# Patient Record
Sex: MALE | Race: WHITE | NOT HISPANIC OR LATINO | Employment: OTHER | URBAN - METROPOLITAN AREA
[De-identification: names, ages, dates, MRNs, and addresses within clinical notes are randomized per-mention and may not be internally consistent; named-entity substitution may affect disease eponyms.]

---

## 2018-07-09 ENCOUNTER — OFFICE VISIT CONVERTED (OUTPATIENT)
Dept: SURGERY | Facility: CLINIC | Age: 78
End: 2018-07-09
Attending: UROLOGY

## 2018-08-22 ENCOUNTER — OFFICE VISIT CONVERTED (OUTPATIENT)
Dept: OTHER | Facility: HOSPITAL | Age: 78
End: 2018-08-22
Attending: NURSE PRACTITIONER

## 2018-11-21 ENCOUNTER — OFFICE VISIT CONVERTED (OUTPATIENT)
Dept: OTHER | Facility: HOSPITAL | Age: 78
End: 2018-11-21
Attending: NURSE PRACTITIONER

## 2018-11-21 ENCOUNTER — CONVERSION ENCOUNTER (OUTPATIENT)
Dept: OTHER | Facility: HOSPITAL | Age: 78
End: 2018-11-21

## 2019-02-21 ENCOUNTER — OFFICE VISIT CONVERTED (OUTPATIENT)
Dept: OTHER | Facility: HOSPITAL | Age: 79
End: 2019-02-21
Attending: NURSE PRACTITIONER

## 2019-05-23 ENCOUNTER — HOSPITAL ENCOUNTER (OUTPATIENT)
Dept: OTHER | Facility: HOSPITAL | Age: 79
Discharge: HOME | End: 2019-05-23

## 2019-05-23 ENCOUNTER — CONVERSION ENCOUNTER (OUTPATIENT)
Dept: OTHER | Facility: HOSPITAL | Age: 79
End: 2019-05-23

## 2019-05-23 ENCOUNTER — OFFICE VISIT CONVERTED (OUTPATIENT)
Dept: OTHER | Facility: HOSPITAL | Age: 79
End: 2019-05-23
Attending: NURSE PRACTITIONER

## 2019-05-23 LAB
ANION GAP SERPL CALC-SCNC: 18 MMOL/L (ref 8–19)
BASOPHILS # BLD AUTO: 0.08 10*3/UL (ref 0–0.2)
BASOPHILS NFR BLD AUTO: 1.3 % (ref 0–3)
BUN SERPL-MCNC: 23 MG/DL (ref 5–25)
BUN/CREAT SERPL: 16 {RATIO} (ref 6–20)
CALCIUM SERPL-MCNC: 9.5 MG/DL (ref 8.7–10.4)
CHLORIDE SERPL-SCNC: 107 MMOL/L (ref 99–111)
CONV ABS IMM GRAN: 0.03 10*3/UL (ref 0–0.2)
CONV CO2: 24 MMOL/L (ref 22–32)
CONV IMMATURE GRAN: 0.5 % (ref 0–1.8)
CREAT UR-MCNC: 1.41 MG/DL (ref 0.7–1.2)
DEPRECATED RDW RBC AUTO: 44.7 FL (ref 35.1–43.9)
EOSINOPHIL # BLD AUTO: 0.27 10*3/UL (ref 0–0.7)
EOSINOPHIL # BLD AUTO: 4.3 % (ref 0–7)
ERYTHROCYTE [DISTWIDTH] IN BLOOD BY AUTOMATED COUNT: 13.1 % (ref 11.6–14.4)
GFR SERPLBLD BASED ON 1.73 SQ M-ARVRAT: 48 ML/MIN/{1.73_M2}
GLUCOSE SERPL-MCNC: 126 MG/DL (ref 70–99)
HBA1C MFR BLD: 12.6 G/DL (ref 14–18)
HCT VFR BLD AUTO: 38.7 % (ref 42–52)
LYMPHOCYTES # BLD AUTO: 1.49 10*3/UL (ref 1–5)
MAGNESIUM SERPL-MCNC: 1.84 MG/DL (ref 1.6–2.3)
MCH RBC QN AUTO: 31 PG (ref 27–31)
MCHC RBC AUTO-ENTMCNC: 32.6 G/DL (ref 33–37)
MCV RBC AUTO: 95.1 FL (ref 80–96)
MONOCYTES # BLD AUTO: 0.46 10*3/UL (ref 0.2–1.2)
MONOCYTES NFR BLD AUTO: 7.3 % (ref 3–10)
NEUTROPHILS # BLD AUTO: 3.94 10*3/UL (ref 2–8)
NEUTROPHILS NFR BLD AUTO: 62.8 % (ref 30–85)
NRBC CBCN: 0 % (ref 0–0.7)
OSMOLALITY SERPL CALC.SUM OF ELEC: 303 MOSM/KG (ref 273–304)
PLATELET # BLD AUTO: 190 10*3/UL (ref 130–400)
PMV BLD AUTO: 12.1 FL (ref 9.4–12.4)
POTASSIUM SERPL-SCNC: 4.7 MMOL/L (ref 3.5–5.3)
RBC # BLD AUTO: 4.07 10*6/UL (ref 4.7–6.1)
SODIUM SERPL-SCNC: 144 MMOL/L (ref 135–147)
TSH SERPL-ACNC: 9.4 M[IU]/L (ref 0.27–4.2)
VARIANT LYMPHS NFR BLD MANUAL: 23.8 % (ref 20–45)
WBC # BLD AUTO: 6.27 10*3/UL (ref 4.8–10.8)

## 2019-05-24 LAB — CONV THYROXINE TOTAL: 6.8 UG/DL (ref 4.5–12)

## 2019-06-10 ENCOUNTER — OFFICE VISIT CONVERTED (OUTPATIENT)
Dept: SURGERY | Facility: CLINIC | Age: 79
End: 2019-06-10
Attending: UROLOGY

## 2019-06-12 ENCOUNTER — HOSPITAL ENCOUNTER (OUTPATIENT)
Dept: PERIOP | Facility: HOSPITAL | Age: 79
Setting detail: HOSPITAL OUTPATIENT SURGERY
Discharge: HOME | End: 2019-06-12

## 2019-06-12 LAB
GLUCOSE BLD-MCNC: 130 MG/DL (ref 70–99)
GLUCOSE BLD-MCNC: 155 MG/DL (ref 70–99)

## 2019-06-18 ENCOUNTER — OFFICE VISIT CONVERTED (OUTPATIENT)
Dept: SURGERY | Facility: CLINIC | Age: 79
End: 2019-06-18
Attending: PHYSICIAN ASSISTANT

## 2019-06-20 LAB
COLOR STONE: NORMAL
COMPN STONE: NORMAL
CONV CA OXALATE MONOHYDRATE: 97 %
CONV CALCIUM PHOSPHATE: 3 %
CONV CALCULI COMMENT: NORMAL
CONV CALCULI DISCLAIMER: NORMAL
CONV CALCULI NOTE: NORMAL
NIDUS STONE QL: NORMAL
SIZE STONE: NORMAL MM
WT STONE: 35.2 MG

## 2019-07-02 ENCOUNTER — OFFICE VISIT CONVERTED (OUTPATIENT)
Dept: SURGERY | Facility: CLINIC | Age: 79
End: 2019-07-02
Attending: UROLOGY

## 2019-08-22 ENCOUNTER — OFFICE VISIT CONVERTED (OUTPATIENT)
Dept: OTHER | Facility: HOSPITAL | Age: 79
End: 2019-08-22
Attending: NURSE PRACTITIONER

## 2019-08-22 ENCOUNTER — HOSPITAL ENCOUNTER (OUTPATIENT)
Dept: OTHER | Facility: HOSPITAL | Age: 79
Discharge: HOME | End: 2019-08-22

## 2019-08-22 ENCOUNTER — CONVERSION ENCOUNTER (OUTPATIENT)
Dept: OTHER | Facility: HOSPITAL | Age: 79
End: 2019-08-22

## 2019-08-22 LAB
ALBUMIN SERPL-MCNC: 3.8 G/DL (ref 3.5–5)
ALBUMIN/GLOB SERPL: 1.2 {RATIO} (ref 1.4–2.6)
ALP SERPL-CCNC: 88 U/L (ref 56–155)
ALT SERPL-CCNC: 29 U/L (ref 10–40)
ANION GAP SERPL CALC-SCNC: 23 MMOL/L (ref 8–19)
AST SERPL-CCNC: 31 U/L (ref 15–50)
BASOPHILS # BLD AUTO: 0.09 10*3/UL (ref 0–0.2)
BASOPHILS NFR BLD AUTO: 1.4 % (ref 0–3)
BILIRUB SERPL-MCNC: 0.37 MG/DL (ref 0.2–1.3)
BUN SERPL-MCNC: 16 MG/DL (ref 5–25)
BUN/CREAT SERPL: 13 {RATIO} (ref 6–20)
CALCIUM SERPL-MCNC: 9.7 MG/DL (ref 8.7–10.4)
CHLORIDE SERPL-SCNC: 104 MMOL/L (ref 99–111)
CHOLEST SERPL-MCNC: 142 MG/DL (ref 107–200)
CHOLEST/HDLC SERPL: 5.1 {RATIO} (ref 3–6)
CONV ABS IMM GRAN: 0.03 10*3/UL (ref 0–0.2)
CONV CO2: 18 MMOL/L (ref 22–32)
CONV CREATININE URINE, RANDOM: 71 MG/DL (ref 10–300)
CONV IMMATURE GRAN: 0.5 % (ref 0–1.8)
CONV MICROALBUM.,U,RANDOM: 1853.3 MG/L (ref 0–20)
CONV TOTAL PROTEIN: 6.9 G/DL (ref 6.3–8.2)
CREAT UR-MCNC: 1.28 MG/DL (ref 0.7–1.2)
DEPRECATED RDW RBC AUTO: 46.4 FL (ref 35.1–43.9)
DIGOXIN SERPL-MCNC: 0.4 NG/ML (ref 0.5–2)
EOSINOPHIL # BLD AUTO: 0.34 10*3/UL (ref 0–0.7)
EOSINOPHIL # BLD AUTO: 5.2 % (ref 0–7)
ERYTHROCYTE [DISTWIDTH] IN BLOOD BY AUTOMATED COUNT: 13.3 % (ref 11.6–14.4)
EST. AVERAGE GLUCOSE BLD GHB EST-MCNC: 148 MG/DL
GFR SERPLBLD BASED ON 1.73 SQ M-ARVRAT: 53 ML/MIN/{1.73_M2}
GLOBULIN UR ELPH-MCNC: 3.1 G/DL (ref 2–3.5)
GLUCOSE SERPL-MCNC: 65 MG/DL (ref 70–99)
HBA1C MFR BLD: 6.8 % (ref 3.5–5.7)
HCT VFR BLD AUTO: 34.5 % (ref 42–52)
HDLC SERPL-MCNC: 28 MG/DL (ref 40–60)
HGB BLD-MCNC: 11 G/DL (ref 14–18)
LDLC SERPL CALC-MCNC: 71 MG/DL (ref 70–100)
LYMPHOCYTES # BLD AUTO: 1.33 10*3/UL (ref 1–5)
LYMPHOCYTES NFR BLD AUTO: 20.3 % (ref 20–45)
MCH RBC QN AUTO: 30.2 PG (ref 27–31)
MCHC RBC AUTO-ENTMCNC: 31.9 G/DL (ref 33–37)
MCV RBC AUTO: 94.8 FL (ref 80–96)
MICROALBUMIN/CREAT UR: 2610.3 MG/G{CRE} (ref 0–25)
MONOCYTES # BLD AUTO: 0.57 10*3/UL (ref 0.2–1.2)
MONOCYTES NFR BLD AUTO: 8.7 % (ref 3–10)
NEUTROPHILS # BLD AUTO: 4.2 10*3/UL (ref 2–8)
NEUTROPHILS NFR BLD AUTO: 63.9 % (ref 30–85)
NRBC CBCN: 0 % (ref 0–0.7)
OSMOLALITY SERPL CALC.SUM OF ELEC: 291 MOSM/KG (ref 273–304)
PLATELET # BLD AUTO: 216 10*3/UL (ref 130–400)
PMV BLD AUTO: 12 FL (ref 9.4–12.4)
POTASSIUM SERPL-SCNC: 4 MMOL/L (ref 3.5–5.3)
PSA SERPL-MCNC: 1.18 NG/ML (ref 0–4)
RBC # BLD AUTO: 3.64 10*6/UL (ref 4.7–6.1)
SODIUM SERPL-SCNC: 141 MMOL/L (ref 135–147)
T4 FREE SERPL-MCNC: 1.2 NG/DL (ref 0.9–1.8)
TRIGL SERPL-MCNC: 217 MG/DL (ref 40–150)
TSH SERPL-ACNC: 20.73 M[IU]/L (ref 0.27–4.2)
VLDLC SERPL-MCNC: 43 MG/DL (ref 5–37)
WBC # BLD AUTO: 6.56 10*3/UL (ref 4.8–10.8)

## 2019-11-22 ENCOUNTER — CONVERSION ENCOUNTER (OUTPATIENT)
Dept: OTHER | Facility: HOSPITAL | Age: 79
End: 2019-11-22

## 2019-11-22 ENCOUNTER — OFFICE VISIT CONVERTED (OUTPATIENT)
Dept: OTHER | Facility: HOSPITAL | Age: 79
End: 2019-11-22
Attending: NURSE PRACTITIONER

## 2019-11-22 ENCOUNTER — HOSPITAL ENCOUNTER (OUTPATIENT)
Dept: OTHER | Facility: HOSPITAL | Age: 79
Discharge: HOME | End: 2019-11-22

## 2019-11-22 LAB
DIGOXIN SERPL-MCNC: 0.8 NG/ML (ref 0.5–2)
FERRITIN SERPL-MCNC: 260 NG/ML (ref 30–300)
IRON SATN MFR SERPL: 34 % (ref 20–55)
IRON SERPL-MCNC: 106 UG/DL (ref 70–180)
T4 FREE SERPL-MCNC: 1.4 NG/DL (ref 0.9–1.8)
TIBC SERPL-MCNC: 309 UG/DL (ref 245–450)
TRANSFERRIN SERPL-MCNC: 216 MG/DL (ref 215–365)
TSH SERPL-ACNC: 0.31 M[IU]/L (ref 0.27–4.2)

## 2019-11-25 LAB
CONV ANTI MICROSOMAL AB: 11 IU/ML (ref 0–34)
THYROGLOBULIN ANTIBODY: <1 IU/ML (ref 0–0.9)

## 2020-01-15 ENCOUNTER — OFFICE VISIT CONVERTED (OUTPATIENT)
Dept: UROLOGY | Facility: CLINIC | Age: 80
End: 2020-01-15
Attending: UROLOGY

## 2020-05-29 ENCOUNTER — PROCEDURE VISIT CONVERTED (OUTPATIENT)
Dept: UROLOGY | Facility: CLINIC | Age: 80
End: 2020-05-29
Attending: UROLOGY

## 2020-11-30 ENCOUNTER — HOSPITAL ENCOUNTER (OUTPATIENT)
Dept: LAB | Facility: HOSPITAL | Age: 80
Discharge: HOME | End: 2020-11-30
Attending: INTERNAL MEDICINE

## 2020-11-30 LAB
ALBUMIN SERPL-MCNC: 3.6 G/DL (ref 3.5–5)
ALBUMIN/GLOB SERPL: 1.2 {RATIO} (ref 1.4–2.6)
ALP SERPL-CCNC: 100 U/L (ref 56–155)
ALT SERPL-CCNC: 15 U/L (ref 10–40)
ANION GAP SERPL CALC-SCNC: 18 MMOL/L (ref 8–19)
AST SERPL-CCNC: 15 U/L (ref 15–50)
BILIRUB SERPL-MCNC: 0.34 MG/DL (ref 0.2–1.3)
BUN SERPL-MCNC: 37 MG/DL (ref 5–25)
BUN/CREAT SERPL: 15 {RATIO} (ref 6–20)
CALCIUM SERPL-MCNC: 9.4 MG/DL (ref 8.7–10.4)
CHLORIDE SERPL-SCNC: 101 MMOL/L (ref 99–111)
CONV CO2: 22 MMOL/L (ref 22–32)
CONV TOTAL PROTEIN: 6.6 G/DL (ref 6.3–8.2)
CREAT UR-MCNC: 2.53 MG/DL (ref 0.7–1.2)
GFR SERPLBLD BASED ON 1.73 SQ M-ARVRAT: 23 ML/MIN/{1.73_M2}
GLOBULIN UR ELPH-MCNC: 3 G/DL (ref 2–3.5)
GLUCOSE SERPL-MCNC: 243 MG/DL (ref 70–99)
OSMOLALITY SERPL CALC.SUM OF ELEC: 299 MOSM/KG (ref 273–304)
PHOSPHATE SERPL-MCNC: 3.7 MG/DL (ref 2.4–4.5)
POTASSIUM SERPL-SCNC: 4.7 MMOL/L (ref 3.5–5.3)
SODIUM SERPL-SCNC: 136 MMOL/L (ref 135–147)

## 2021-04-16 ENCOUNTER — OFFICE VISIT CONVERTED (OUTPATIENT)
Dept: UROLOGY | Facility: CLINIC | Age: 81
End: 2021-04-16
Attending: UROLOGY

## 2021-05-10 NOTE — PROCEDURES
Procedure Note      Patient Name: Del Cueva   Patient ID: [id number]   Sex: Male   YOB: 1940    Primary Care Provider: Darby ARANGO   Referring Provider: Darby ARANGO    Visit Date: May 29, 2020    Provider: Jaime Martin MD   Location: [facility]   Location Address: [address] [place] [address]  [id number]   Location Phone: [phone]          Cystoscopy Procedure:  The patients urine was viewe d under a microscope during his clinical visit: no RBC present, no WBC present, no Bacteria present.          PROCEDURE: Flexible cystoscope was passed per urethra into the bladder without difficulty after proper consent.     Had to dilate to 20F to get the scope in     3 cm prostate with a 1 cm median lobe protruding up into the bladder on the left side mainly above the 3 cm.    Bladder is moderately trabeculated throughout.  No stones or pathology    The bladder was inspected in a systematic meridian fashion. There were no tumors, lesions, stones, or other abnormalities noted within the bladder. Of note, there was no increased vascularity as well. Both ureteral orifices were identified and were normal in appearance. The flexible cystoscope was removed. The patient tolerated the procedure well.           Assessment  · Benign prostatic hyperplasia with weak urinary stream       Benign prostatic hyperplasia with lower urinary tract symptoms     600.01/N40.1  Poor urinary stream     600.01/R39.12    Problems Reconciled  Plan  · Orders  o Cystoscopy (20244) - 600.01/N40.1, 600.01/R39.12 - 05/29/2020  · Medications  o Medications have been Reconciled  o Transition of Care or Provider Policy  · Instructions  o We will follow up in one year or sooner if needed.  o Electronically Identified Patient Education Materials Provided Electronically     2/20 creatinine 1.8, GFR 37    PVR today 82 mL    cont Flomax and finasteride. refilled today. Has been interested in Lexy in  the past.  After discussion of risk and benefits at this time we will keep his keep on meds     Follow-up in 6 months with a PVR             Electronically Signed by: Jaime Martin MD -Author on May 30, 2020 06:37:23 AM

## 2021-05-14 VITALS — WEIGHT: 196 LBS | HEIGHT: 69 IN | RESPIRATION RATE: 17 BRPM | BODY MASS INDEX: 29.03 KG/M2

## 2021-05-14 NOTE — PROGRESS NOTES
Progress Note      Patient Name: Del Cueva   Patient ID: [id number]   Sex: Male   YOB: 1940    Primary Care Provider: Darby ARANGO    Visit Date: April 16, 2021    Provider: Jaime Martin MD   Location: [facility]   Location Address: [address] [place] [address]  [id number]   Location Phone: [phone]          Chief Complaint  · pt here for urologic issues      History Of Present Illness     80-year-old  gentleman who follows up on BPH h/o bladder stones.    Currently on Flomax 0.4 and finasteride QD. No trouble initiation of stream.  Started finasteride last year and is easier to go at this point.  Nocturia X  2.  No frequency. Minimal  incontinence.    No  pads    5/20 cystoscopyat the dilated 20 Indonesian to get scope in, 3 cm prostate with a 1 cm median lobe.  Moderately trabeculated.  Negative otherwise    No UTI/GH    1/20 creatinine 1.8, GFR 37    Could not void today.    PVR     5/20  82    Previous    6/19 cystoscopy - significant for lateral lobe hypertrophy of the prostate.  Bladder normal otherwise  7/19 cystolitholapaxy    6/19 CT abdomen/pelvis without6 mm nonobstructing stone posterior right side urinary bladder.  No renal stones.  No hydronephrosis.  Urinary bladder wall thickening.  47 g prostate     No history of kidney stone.    No urologic family history,   Has never had any urologic surgery.    History of MI, CABG x2, aspirin 325.  Former smoker. He stopped 28 years ago. He smoked for 30 years - at least a pack a day.     7/2/19 - bladder stone -  97% calcium oxalate.    PSA    2/20 1.3  8/19 1.18  5/18 5.6  11/17 4.3  8/17  6.2  1/17  5.1  7/16  7.8  1/16  4.6       Past Medical History  Bladder stone; BPH; CAD (coronary artery disease); Diabetes; Diabetic Eye Exam Last Completed; Heart block; HLD (hyperlipidemia); Hypertension; Hypothyroid; Skin cancer; Stroke; Vitamin D deficiency         Past Surgical  "History  Cystolitholapaxy; Cystoscopy; heart surgery; Shoulder surgery         Medication List  aspirin 81 mg oral tablet,delayed release (DR/EC); atorvastatin 40 mg oral tablet; B12 5,000-100 mcg sublingual lozenge; bumetanide 2 mg oral tablet; Centrum oral; clopidogrel 75 mg oral tablet; ferrous sulfate 325 mg (65 mg iron) oral tablet; finasteride 5 mg oral tablet; levothyroxine 175 mcg oral tablet; metoprolol succinate 25 mg oral tablet extended release 24 hr; nateglinide 60 mg oral tablet; Nitrostat 0.4 mg sublingual tablet, sublingual; One touch ultra blue glucometer 0; OneTouch Ultra Blue Test Strip miscellaneous strip; Rollator Walker with Seat; tamsulosin 0.4 mg oral capsule; transport chair 0; Trulicity 0.75 mg/0.5 mL subcutaneous pen injector; Veltassa 8.4 gram oral powder in packet; Vitamin D3 2,000 unit oral capsule         Allergy List  NO KNOWN DRUG ALLERGIES       Allergies Reconciled  Family Medical History  Heart Disease; Hypertension         Social History  Alcohol (Never); Recreational Drug Use (Never); Tobacco (Former);          Immunizations  Name Date Admin   Influenza 11/22/2019   Influenza 11/22/2019   Influenza 11/21/2018   Prevnar 13 01/01/2017         Review of Systems  · Constitutional  o Denies  o : chills, fever  · Gastrointestinal  o Denies  o : nausea, vomiting      Vitals  Date Time BP Position Site L\R Cuff Size HR RR TEMP (F) WT  HT  BMI kg/m2 BSA m2 O2 Sat FR L/min FiO2 HC       04/16/2021 08:40 AM       17  195lbs 16oz 5'  9\" 28.94 2.08             Physical Examination  · Constitutional  o Appearance  o : Well-appearing, well-developed, in no acute distress  · Respiratory  o Respiratory Effort  o : Unlabored breathing  · Neurologic  o Mental Status Examination  o :   § Orientation  § : Grossly oriented to person, place and time, judgment and insight intact, normal mood and affect              Assessment  · Resolved Bladder stone     594.1/N21.0  · Lower urinary tract " symptoms (LUTS)     788.99/R39.9  · Benign prostatic hyperplasia with weak urinary stream       Benign prostatic hyperplasia with lower urinary tract symptoms     600.01/N40.1  Poor urinary stream     600.01/R39.12      Plan  · Medications  o Medications have been Reconciled  o Transition of Care or Provider Policy  · Instructions  o Electronically Identified Patient Education Materials Provided Electronically     BPH    Cont Flomax 0.4 finasteride 5.  Refilled today.  Patient doing okay currently, we did discuss procedures because he has a fairly extensive heart history and other comorbidities after discussion of risk and benefits he will stay on medication.    Follow-up with nurse practitioner in 1 year with PVR             Electronically Signed by: Jaime Martin MD -Author on April 16, 2021 09:27:41 AM

## 2021-05-15 VITALS — WEIGHT: 195 LBS | RESPIRATION RATE: 12 BRPM | BODY MASS INDEX: 28.88 KG/M2 | HEIGHT: 69 IN

## 2021-05-15 VITALS — RESPIRATION RATE: 12 BRPM | WEIGHT: 192.12 LBS | HEIGHT: 69 IN | BODY MASS INDEX: 28.45 KG/M2

## 2021-05-15 VITALS
TEMPERATURE: 97.4 F | WEIGHT: 195 LBS | HEART RATE: 76 BPM | HEIGHT: 69 IN | SYSTOLIC BLOOD PRESSURE: 122 MMHG | OXYGEN SATURATION: 96 % | DIASTOLIC BLOOD PRESSURE: 52 MMHG | RESPIRATION RATE: 18 BRPM | BODY MASS INDEX: 28.88 KG/M2

## 2021-05-15 VITALS
HEART RATE: 86 BPM | TEMPERATURE: 97.4 F | OXYGEN SATURATION: 97 % | DIASTOLIC BLOOD PRESSURE: 74 MMHG | HEIGHT: 69 IN | BODY MASS INDEX: 28.88 KG/M2 | SYSTOLIC BLOOD PRESSURE: 140 MMHG | RESPIRATION RATE: 18 BRPM | SYSTOLIC BLOOD PRESSURE: 147 MMHG | WEIGHT: 195 LBS | DIASTOLIC BLOOD PRESSURE: 82 MMHG

## 2021-05-15 VITALS
RESPIRATION RATE: 18 BRPM | BODY MASS INDEX: 29.03 KG/M2 | SYSTOLIC BLOOD PRESSURE: 138 MMHG | OXYGEN SATURATION: 95 % | DIASTOLIC BLOOD PRESSURE: 60 MMHG | HEART RATE: 80 BPM | TEMPERATURE: 98 F | HEIGHT: 69 IN | WEIGHT: 196 LBS

## 2021-05-15 VITALS — HEIGHT: 69 IN | RESPIRATION RATE: 16 BRPM | BODY MASS INDEX: 28.73 KG/M2 | WEIGHT: 194 LBS

## 2021-05-15 VITALS — RESPIRATION RATE: 20 BRPM | WEIGHT: 195 LBS | BODY MASS INDEX: 28.88 KG/M2 | HEIGHT: 69 IN

## 2021-05-16 VITALS
OXYGEN SATURATION: 98 % | RESPIRATION RATE: 18 BRPM | DIASTOLIC BLOOD PRESSURE: 60 MMHG | TEMPERATURE: 97.9 F | HEIGHT: 69 IN | BODY MASS INDEX: 29.62 KG/M2 | WEIGHT: 200 LBS | SYSTOLIC BLOOD PRESSURE: 138 MMHG | HEART RATE: 90 BPM

## 2021-05-16 VITALS
RESPIRATION RATE: 18 BRPM | BODY MASS INDEX: 28.88 KG/M2 | SYSTOLIC BLOOD PRESSURE: 128 MMHG | OXYGEN SATURATION: 95 % | WEIGHT: 195 LBS | HEIGHT: 69 IN | DIASTOLIC BLOOD PRESSURE: 60 MMHG | TEMPERATURE: 97.5 F | HEART RATE: 75 BPM

## 2021-05-16 VITALS
WEIGHT: 200 LBS | BODY MASS INDEX: 29.62 KG/M2 | DIASTOLIC BLOOD PRESSURE: 60 MMHG | HEIGHT: 69 IN | OXYGEN SATURATION: 95 % | TEMPERATURE: 98 F | HEART RATE: 103 BPM | RESPIRATION RATE: 18 BRPM | SYSTOLIC BLOOD PRESSURE: 138 MMHG

## 2021-05-16 VITALS — WEIGHT: 194 LBS | BODY MASS INDEX: 28.73 KG/M2 | RESPIRATION RATE: 17 BRPM | HEIGHT: 69 IN

## 2021-08-30 ENCOUNTER — TRANSCRIBE ORDERS (OUTPATIENT)
Dept: ADMINISTRATIVE | Facility: HOSPITAL | Age: 81
End: 2021-08-30

## 2021-08-30 DIAGNOSIS — N18.9 CHRONIC KIDNEY DISEASE, UNSPECIFIED CKD STAGE: Primary | ICD-10-CM

## 2021-09-02 ENCOUNTER — HOSPITAL ENCOUNTER (OUTPATIENT)
Dept: INFUSION THERAPY | Facility: HOSPITAL | Age: 81
Setting detail: INFUSION SERIES
Discharge: HOME | End: 2021-09-02

## 2021-09-02 VITALS
SYSTOLIC BLOOD PRESSURE: 164 MMHG | RESPIRATION RATE: 20 BRPM | WEIGHT: 204.37 LBS | HEART RATE: 72 BPM | HEIGHT: 69 IN | DIASTOLIC BLOOD PRESSURE: 68 MMHG | BODY MASS INDEX: 30.27 KG/M2 | TEMPERATURE: 98.3 F | OXYGEN SATURATION: 98 %

## 2021-09-02 DIAGNOSIS — D50.9 IRON DEFICIENCY ANEMIA, UNSPECIFIED IRON DEFICIENCY ANEMIA TYPE: Primary | ICD-10-CM

## 2021-09-02 PROCEDURE — 25010000002 IRON SUCROSE PER 1 MG: Performed by: INTERNAL MEDICINE

## 2021-09-02 PROCEDURE — 96366 THER/PROPH/DIAG IV INF ADDON: CPT

## 2021-09-02 PROCEDURE — 96365 THER/PROPH/DIAG IV INF INIT: CPT

## 2021-09-02 RX ORDER — LEVOTHYROXINE SODIUM 137 UG/1
137 TABLET ORAL NIGHTLY
COMMUNITY
Start: 2021-07-12

## 2021-09-02 RX ORDER — ATORVASTATIN CALCIUM 40 MG/1
40 TABLET, FILM COATED ORAL DAILY
COMMUNITY
Start: 2021-06-10

## 2021-09-02 RX ORDER — GLIPIZIDE 5 MG/1
5 TABLET ORAL DAILY
COMMUNITY
Start: 2021-07-28

## 2021-09-02 RX ORDER — NATEGLINIDE 60 MG/1
60 TABLET ORAL 3 TIMES DAILY
COMMUNITY
Start: 2021-08-06

## 2021-09-02 RX ORDER — BROMPHENIRAMINE MALEATE, PSEUDOEPHEDRINE HYDROCHLORIDE, AND DEXTROMETHORPHAN HYDROBROMIDE 2; 30; 10 MG/5ML; MG/5ML; MG/5ML
5 SYRUP ORAL 4 TIMES DAILY PRN
COMMUNITY
Start: 2021-07-15 | End: 2022-03-16

## 2021-09-02 RX ORDER — FINASTERIDE 5 MG/1
5 TABLET, FILM COATED ORAL DAILY
COMMUNITY
Start: 2021-07-13

## 2021-09-02 RX ORDER — BUMETANIDE 2 MG/1
2 TABLET ORAL 2 TIMES DAILY
COMMUNITY
Start: 2021-07-06

## 2021-09-02 RX ORDER — TAMSULOSIN HYDROCHLORIDE 0.4 MG/1
0.4 CAPSULE ORAL NIGHTLY
COMMUNITY
Start: 2021-06-10

## 2021-09-02 RX ORDER — ERGOCALCIFEROL 1.25 MG/1
50000 CAPSULE ORAL WEEKLY
COMMUNITY
Start: 2021-07-16

## 2021-09-02 RX ORDER — SODIUM BICARBONATE 650 MG/1
1300 TABLET ORAL 3 TIMES DAILY
COMMUNITY
Start: 2021-06-10

## 2021-09-02 RX ORDER — CLOPIDOGREL BISULFATE 75 MG/1
75 TABLET ORAL DAILY
COMMUNITY
Start: 2021-07-06

## 2021-09-02 RX ADMIN — IRON SUCROSE 500 MG: 20 INJECTION, SOLUTION INTRAVENOUS at 13:10

## 2021-09-09 ENCOUNTER — HOSPITAL ENCOUNTER (OUTPATIENT)
Dept: INFUSION THERAPY | Facility: HOSPITAL | Age: 81
Setting detail: INFUSION SERIES
Discharge: HOME | End: 2021-09-09

## 2021-09-09 VITALS
BODY MASS INDEX: 29.2 KG/M2 | TEMPERATURE: 98.3 F | HEART RATE: 85 BPM | OXYGEN SATURATION: 97 % | WEIGHT: 197.75 LBS | SYSTOLIC BLOOD PRESSURE: 149 MMHG | DIASTOLIC BLOOD PRESSURE: 66 MMHG

## 2021-09-09 DIAGNOSIS — D50.9 IRON DEFICIENCY ANEMIA, UNSPECIFIED IRON DEFICIENCY ANEMIA TYPE: Primary | ICD-10-CM

## 2021-09-09 PROCEDURE — 96366 THER/PROPH/DIAG IV INF ADDON: CPT

## 2021-09-09 PROCEDURE — 96365 THER/PROPH/DIAG IV INF INIT: CPT

## 2021-09-09 PROCEDURE — 25010000002 IRON SUCROSE PER 1 MG: Performed by: INTERNAL MEDICINE

## 2021-09-09 RX ADMIN — IRON SUCROSE 500 MG: 20 INJECTION, SOLUTION INTRAVENOUS at 13:28

## 2022-02-14 ENCOUNTER — TRANSCRIBE ORDERS (OUTPATIENT)
Dept: VASCULAR SURGERY | Facility: HOSPITAL | Age: 82
End: 2022-02-14

## 2022-02-14 DIAGNOSIS — N18.6 ESRD (END STAGE RENAL DISEASE): Primary | ICD-10-CM

## 2022-03-03 ENCOUNTER — HOSPITAL ENCOUNTER (OUTPATIENT)
Dept: CARDIOLOGY | Facility: HOSPITAL | Age: 82
Discharge: HOME | End: 2022-03-03

## 2022-03-03 ENCOUNTER — OFFICE VISIT (OUTPATIENT)
Dept: VASCULAR SURGERY | Facility: HOSPITAL | Age: 82
End: 2022-03-03

## 2022-03-03 VITALS
OXYGEN SATURATION: 96 % | SYSTOLIC BLOOD PRESSURE: 170 MMHG | DIASTOLIC BLOOD PRESSURE: 90 MMHG | TEMPERATURE: 97.8 F | RESPIRATION RATE: 16 BRPM | HEART RATE: 96 BPM

## 2022-03-03 DIAGNOSIS — N18.6 ESRD (END STAGE RENAL DISEASE): ICD-10-CM

## 2022-03-03 DIAGNOSIS — N18.4 STAGE 4 CHRONIC KIDNEY DISEASE: Primary | ICD-10-CM

## 2022-03-03 LAB
BH CV VAS MEAS BASILIC ANTECUBITAL FOSSA LEFT: 0.34 CM
BH CV VAS MEAS BASILIC ANTECUBITAL FOSSA RIGHT: 0.23 CM
BH CV VAS MEAS BASILIC FOREARM LEFT - DIST: 0.15 CM
BH CV VAS MEAS BASILIC FOREARM LEFT - MID: 0.33 CM
BH CV VAS MEAS BASILIC FOREARM LEFT - PROX: 0.31 CM
BH CV VAS MEAS BASILIC FOREARM RIGHT - DIST: 0.16 CM
BH CV VAS MEAS BASILIC FOREARM RIGHT - MID: 0.18 CM
BH CV VAS MEAS BASILIC FOREARM RIGHT - PROX: 0.22 CM
BH CV VAS MEAS BASILIC UPPER ARM LEFT - DIST: 0.58 CM
BH CV VAS MEAS BASILIC UPPER ARM LEFT - MID: 0.55 CM
BH CV VAS MEAS BASILIC UPPER ARM LEFT - PROX: 0.55 CM
BH CV VAS MEAS BASILIC UPPER ARM RIGHT - DIST: 0.3 CM
BH CV VAS MEAS BASILIC UPPER ARM RIGHT - MID: 0.44 CM
BH CV VAS MEAS BASILIC UPPER ARM RIGHT - PROX: 0.47 CM
BH CV VAS MEAS CEPHALIC ANTECUBITAL FOSSA LEFT: 0.42 CM
BH CV VAS MEAS CEPHALIC ANTECUBITAL FOSSA RIGHT: 0.3 CM
BH CV VAS MEAS CEPHALIC FOREARM LEFT - DIST: 0.31 CM
BH CV VAS MEAS CEPHALIC FOREARM LEFT - MID: 0.26 CM
BH CV VAS MEAS CEPHALIC FOREARM LEFT - PROX: 0.27 CM
BH CV VAS MEAS CEPHALIC FOREARM RIGHT - DIST: 0.26 CM
BH CV VAS MEAS CEPHALIC FOREARM RIGHT - MID: 0.2 CM
BH CV VAS MEAS CEPHALIC FOREARM RIGHT - PROX: 0.22 CM
BH CV VAS MEAS CEPHALIC UPPER ARM LEFT - DIST: 0.17 CM
BH CV VAS MEAS CEPHALIC UPPER ARM LEFT - MID: 0.2 CM
BH CV VAS MEAS CEPHALIC UPPER ARM LEFT - PROX: 0.19 CM
BH CV VAS MEAS CEPHALIC UPPER ARM RIGHT - DIST: 0.23 CM
BH CV VAS MEAS CEPHALIC UPPER ARM RIGHT - MID: 0.25 CM
BH CV VAS MEAS CEPHALIC UPPER ARM RIGHT - PROX: 0.27 CM
BH CV VAS MEAS RADIAL UPPER ARM LEFT - PROX: 0.19 CM
BH CV VAS MEAS RADIAL UPPER ARM RIGHT - PROX: 0.22 CM
MAXIMAL PREDICTED HEART RATE: 140 BPM
STRESS TARGET HR: 119 BPM
UPPER ARTERIAL LEFT ARM BRACHIAL LENGTH: 0.47 CM
UPPER ARTERIAL RIGHT ARM BRACHIAL LENGTH: 0.4 CM

## 2022-03-03 PROCEDURE — 99204 OFFICE O/P NEW MOD 45 MIN: CPT | Performed by: SURGERY

## 2022-03-03 PROCEDURE — 93985 DUP-SCAN HEMO COMPL BI STD: CPT | Performed by: SURGERY

## 2022-03-03 PROCEDURE — 93985 DUP-SCAN HEMO COMPL BI STD: CPT

## 2022-03-03 PROCEDURE — G0463 HOSPITAL OUTPT CLINIC VISIT: HCPCS | Performed by: SURGERY

## 2022-03-03 NOTE — PROGRESS NOTES
[facility]   HISTORY AND PHYSICAL    Patient Name: Del Cueva  : 1940  MRN: [id number]  Primary Care Physician:  Vic Marrero DO Subjective   Subjective     Chief Complaint: Chronic kidney disease    HPI:    Del Cueva is a 81 y.o. male with chronic kidney disease.  He has not yet on dialysis.  He is right-handed.  All of his questions were answered.        Personal History     Past Medical History:   Diagnosis Date   • Anemia    • CHF (congestive heart failure) (HCC)    • COPD (chronic obstructive pulmonary disease) (HCC)    • Coronary artery disease    • Diabetes mellitus (HCC)    • Disease of thyroid gland        Past Surgical History:   Procedure Laterality Date   • CORONARY ARTERY BYPASS GRAFT     • SHOULDER ROTATOR CUFF REPAIR Left        Family History: family history is not on file. Otherwise pertinent FHx was reviewed and not pertinent to current issue.    Social History:  reports that he has quit smoking. His smokeless tobacco use includes chew. He reports that he does not drink alcohol and does not use drugs.    Home Medications:  Current Outpatient Medications on File Prior to Visit   Medication Sig   • atorvastatin (LIPITOR) 40 MG tablet Take 40 mg by mouth Daily.   • brompheniramine-pseudoephedrine-DM 30-2-10 MG/5ML syrup Take 5 mL by mouth 4 (Four) Times a Day As Needed.   • bumetanide (BUMEX) 2 MG tablet Take 2 mg by mouth 2 (Two) Times a Day.   • clopidogrel (PLAVIX) 75 MG tablet Take 75 mg by mouth Daily.   • finasteride (PROSCAR) 5 MG tablet Take 5 mg by mouth Daily.   • glipizide (GLUCOTROL) 5 MG tablet Take 5 mg by mouth Daily.   • levothyroxine (SYNTHROID, LEVOTHROID) 137 MCG tablet Take 137 mcg by mouth Daily.   • metoprolol tartrate (LOPRESSOR) 25 MG tablet Take 25 mg by mouth 2 (Two) Times a Day.   • nateglinide (STARLIX) 60 MG tablet Take 60 mg by mouth 3 (Three) Times a Day.   • sodium bicarbonate 650 MG tablet Take 1,300 mg by mouth 3 (Three) Times a Day.    • tamsulosin (FLOMAX) 0.4 MG capsule 24 hr capsule Take 0.4 mg by mouth Daily.   • vitamin D (ERGOCALCIFEROL) 1.25 MG (62832 UT) capsule capsule Take 50,000 Units by mouth 1 (One) Time Per Week.     No current facility-administered medications on file prior to visit.          Allergies:  No Known Allergies    Objective   Objective     Vitals:   Temp:  [97.8 °F (36.6 °C)] 97.8 °F (36.6 °C)  Heart Rate:  [96] 96  Resp:  [16] 16  BP: (170)/(90) 170/90    Physical Exam  Constitutional:       Appearance: Normal appearance.   HENT:      Head: Normocephalic and atraumatic.   Eyes:      Extraocular Movements: Extraocular movements intact.      Pupils: Pupils are equal, round, and reactive to light.   Cardiovascular:      Rate and Rhythm: Normal rate and regular rhythm.      Pulses:           Carotid pulses are 2+ on the right side and 2+ on the left side.       Radial pulses are 2+ on the right side and 2+ on the left side.        Femoral pulses are 2+ on the right side and 2+ on the left side.       Popliteal pulses are 2+ on the right side and 2+ on the left side.        Dorsalis pedis pulses are 2+ on the right side and 2+ on the left side.        Posterior tibial pulses are 2+ on the right side and 2+ on the left side.   Pulmonary:      Effort: Pulmonary effort is normal.      Breath sounds: Normal breath sounds.   Abdominal:      General: Abdomen is flat. Bowel sounds are normal.      Palpations: Abdomen is soft.   Musculoskeletal:      Cervical back: Normal range of motion and neck supple.   Skin:     General: Skin is warm and dry.   Neurological:      General: No focal deficit present.      Mental Status: He is alert and oriented to person, place, and time.            Result Review    Most notable findings include: Vein mapping shows small cephalic veins bilaterally.  Basilic veins appear to be adequate.    Assessment/Plan   Assessment / Plan     Brief Patient Summary:  Del Cueva is a 81 y.o. male who has  chronic kidney disease stage IV    Diagnoses and all orders for this visit:    1. Stage 4 chronic kidney disease (HCC) (Primary)  -     Case Request; Standing  -     CBC (No Diff); Future  -     Basic Metabolic Panel; Future  -     Case Request    Other orders  -     Follow Anesthesia Guidelines / Protocol; Future  -     Obtain Informed Consent; Future  -     Provide NPO Instructions to Patient; Future  -     Chlorhexidine Skin Prep; Future         Plan:   We will plan for left arm AV fistula versus AV graft.  Risks and benefits discussed.  Patient agrees and wishes to proceed.    Thank you for allowing me to see your patient.        Electronically signed by Rigo Palmer MD, MD, 03/03/22, 2:59 PM EST.

## 2022-03-16 ENCOUNTER — PRE-ADMISSION TESTING (OUTPATIENT)
Dept: PREADMISSION TESTING | Facility: HOSPITAL | Age: 82
End: 2022-03-16

## 2022-03-16 VITALS
WEIGHT: 211.2 LBS | BODY MASS INDEX: 31.28 KG/M2 | TEMPERATURE: 96.7 F | DIASTOLIC BLOOD PRESSURE: 78 MMHG | SYSTOLIC BLOOD PRESSURE: 138 MMHG | RESPIRATION RATE: 16 BRPM | HEART RATE: 92 BPM | HEIGHT: 69 IN | OXYGEN SATURATION: 97 %

## 2022-03-16 DIAGNOSIS — Z01.818 PRE-OP TESTING: Primary | ICD-10-CM

## 2022-03-16 LAB — QT INTERVAL: 372 MS

## 2022-03-16 PROCEDURE — 93005 ELECTROCARDIOGRAM TRACING: CPT

## 2022-03-16 RX ORDER — ASPIRIN 81 MG/1
81 TABLET ORAL DAILY
COMMUNITY

## 2022-03-16 NOTE — DISCHARGE INSTRUCTIONS
IMPORTANT INSTRUCTIONS - PRE-ADMISSION TESTING  DO NOT EAT OR CHEW anything after midnight the night before your procedure.    You may have CLEAR liquids up to ___2___ hours prior to ARRIVAL time.   Take the following medications the morning of your procedure with JUST A SIP OF WATER:  ___ATORVASTATIN, FINASTERIDE, __________________________________________________________________________________________________________________________________________________________________________________    DO NOT BRING your medications to the hospital with you, UNLESS something has changed since your PRE-Admission Testing appointment.  AFTER MARCH 22, 2022 Hold all vitamins, supplements, and NSAIDS (Non- steroidal anti-inflammatory meds) for one week prior to surgery (you MAY take Tylenol or Acetaminophen).  If you are diabetic, check your blood sugar the morning of your procedure. If it is less than 70 or if you are feeling symptomatic, call the following number for further instructions: [phone]  Use your inhalers/nebulizers as usual, the morning of your procedure. BRING YOUR INHALERS with you.   Bring your CPAP or BIPAP to hospital, ONLY IF YOU WILL BE SPENDING THE NIGHT.   Make sure you have a ride home and have someone who will stay with you the day of your procedure after you go home.  If you have any questions, please call your Pre-Admission Testing Nurse, ___ZHENG_____________ at [phone]___________.   Per anesthesia request, do not smoke for 24 hours before your procedure or as instructed by your surgeon.     BATHING INSTRUCTIONS GIVEN. NO JEWELRY DAY OF PROCEDURE.   COME TO SAME AREA HAD PAT APPT ELEVATOR A 3RD FLOOR  NO CHEWING TOBACCO 24 HOURS PRIOR TO PROCEDURE  WILL CALL DAY BEFORE PROCEDURE AND GIVE OFFICIAL ARRIVAL TIME

## 2022-03-18 NOTE — NURSING NOTE
Daughter had called leaving message that patient is on metoprolol bid. Home medications updated and will let know needs to take morning of procedure.

## 2022-03-22 ENCOUNTER — ANESTHESIA EVENT (OUTPATIENT)
Dept: PERIOP | Facility: HOSPITAL | Age: 82
End: 2022-03-22

## 2022-03-30 ENCOUNTER — HOSPITAL ENCOUNTER (OUTPATIENT)
Facility: HOSPITAL | Age: 82
Setting detail: HOSPITAL OUTPATIENT SURGERY
Discharge: HOME | End: 2022-03-30
Attending: SURGERY | Admitting: SURGERY

## 2022-03-30 ENCOUNTER — ANESTHESIA (OUTPATIENT)
Dept: PERIOP | Facility: HOSPITAL | Age: 82
End: 2022-03-30

## 2022-03-30 VITALS
DIASTOLIC BLOOD PRESSURE: 63 MMHG | SYSTOLIC BLOOD PRESSURE: 151 MMHG | HEART RATE: 85 BPM | TEMPERATURE: 97 F | OXYGEN SATURATION: 96 % | WEIGHT: 212.52 LBS | BODY MASS INDEX: 31.48 KG/M2 | HEIGHT: 69 IN | RESPIRATION RATE: 16 BRPM

## 2022-03-30 DIAGNOSIS — N18.4 STAGE 4 CHRONIC KIDNEY DISEASE: ICD-10-CM

## 2022-03-30 LAB
ANION GAP SERPL CALCULATED.3IONS-SCNC: 14 MMOL/L (ref 5–15)
BUN SERPL-MCNC: 50 MG/DL (ref 8–23)
BUN/CREAT SERPL: 13.8 (ref 7–25)
CALCIUM SPEC-SCNC: 9.2 MG/DL (ref 8.6–10.5)
CHLORIDE SERPL-SCNC: 101 MMOL/L (ref 98–107)
CO2 SERPL-SCNC: 21 MMOL/L (ref 22–29)
CREAT SERPL-MCNC: 3.63 MG/DL (ref 0.76–1.27)
DEPRECATED RDW RBC AUTO: 62.2 FL (ref 37–54)
EGFRCR SERPLBLD CKD-EPI 2021: 16.2 ML/MIN/1.73
ERYTHROCYTE [DISTWIDTH] IN BLOOD BY AUTOMATED COUNT: 17.8 % (ref 12.3–15.4)
GLUCOSE SERPL-MCNC: 92 MG/DL (ref 65–99)
HCT VFR BLD AUTO: 29.6 % (ref 37.5–51)
HGB BLD-MCNC: 9.8 G/DL (ref 13–17.7)
MCH RBC QN AUTO: 33.1 PG (ref 26.6–33)
MCHC RBC AUTO-ENTMCNC: 33.1 G/DL (ref 31.5–35.7)
MCV RBC AUTO: 100 FL (ref 79–97)
PLATELET # BLD AUTO: 200 10*3/MM3 (ref 140–450)
PMV BLD AUTO: 10 FL (ref 6–12)
POTASSIUM SERPL-SCNC: 4.8 MMOL/L (ref 3.5–5.2)
RBC # BLD AUTO: 2.96 10*6/MM3 (ref 4.14–5.8)
SODIUM SERPL-SCNC: 136 MMOL/L (ref 136–145)
WBC NRBC COR # BLD: 4.84 10*3/MM3 (ref 3.4–10.8)

## 2022-03-30 PROCEDURE — C1889 IMPLANT/INSERT DEVICE, NOC: HCPCS | Performed by: SURGERY

## 2022-03-30 PROCEDURE — 25010000002 CEFAZOLIN IN DEXTROSE 2-4 GM/100ML-% SOLUTION: Performed by: SURGERY

## 2022-03-30 PROCEDURE — 25010000002 METOCLOPRAMIDE PER 10 MG: Performed by: ANESTHESIOLOGY

## 2022-03-30 PROCEDURE — 25010000002 PROPOFOL 10 MG/ML EMULSION

## 2022-03-30 PROCEDURE — 25010000002 DEXAMETHASONE PER 1 MG

## 2022-03-30 PROCEDURE — 25010000002 ONDANSETRON PER 1 MG: Performed by: NURSE ANESTHETIST, CERTIFIED REGISTERED

## 2022-03-30 PROCEDURE — 25010000002 MIDAZOLAM PER 1 MG: Performed by: ANESTHESIOLOGY

## 2022-03-30 PROCEDURE — 25010000002 HEPARIN (PORCINE) PER 1000 UNITS: Performed by: SURGERY

## 2022-03-30 PROCEDURE — 36819 AV FUSE UPPR ARM BASILIC: CPT | Performed by: SURGERY

## 2022-03-30 PROCEDURE — 85027 COMPLETE CBC AUTOMATED: CPT | Performed by: SURGERY

## 2022-03-30 PROCEDURE — 25010000002 FENTANYL CITRATE (PF) 50 MCG/ML SOLUTION

## 2022-03-30 PROCEDURE — 80048 BASIC METABOLIC PNL TOTAL CA: CPT | Performed by: SURGERY

## 2022-03-30 DEVICE — CLIP LIGAT VASC HORIZON TI SM YEL 6CT: Type: IMPLANTABLE DEVICE | Site: ARM | Status: FUNCTIONAL

## 2022-03-30 DEVICE — CLIP LIGAT VASC HORIZON TI MD BLU 6CT: Type: IMPLANTABLE DEVICE | Site: ARM | Status: FUNCTIONAL

## 2022-03-30 DEVICE — ABSORBABLE HEMOSTAT (OXIDIZED REGENERATED CELLULOSE, U.S.P.)
Type: IMPLANTABLE DEVICE | Site: ARM | Status: FUNCTIONAL
Brand: SURGICEL

## 2022-03-30 RX ORDER — GLYCOPYRROLATE 0.2 MG/ML
INJECTION INTRAMUSCULAR; INTRAVENOUS AS NEEDED
Status: DISCONTINUED | OUTPATIENT
Start: 2022-03-30 | End: 2022-03-30 | Stop reason: SURG

## 2022-03-30 RX ORDER — DEXAMETHASONE SODIUM PHOSPHATE 4 MG/ML
INJECTION, SOLUTION INTRA-ARTICULAR; INTRALESIONAL; INTRAMUSCULAR; INTRAVENOUS; SOFT TISSUE AS NEEDED
Status: DISCONTINUED | OUTPATIENT
Start: 2022-03-30 | End: 2022-03-30 | Stop reason: SURG

## 2022-03-30 RX ORDER — MIDAZOLAM HYDROCHLORIDE 1 MG/ML
2 INJECTION INTRAMUSCULAR; INTRAVENOUS ONCE
Status: COMPLETED | OUTPATIENT
Start: 2022-03-30 | End: 2022-03-30

## 2022-03-30 RX ORDER — PROPOFOL 10 MG/ML
VIAL (ML) INTRAVENOUS AS NEEDED
Status: DISCONTINUED | OUTPATIENT
Start: 2022-03-30 | End: 2022-03-30 | Stop reason: SURG

## 2022-03-30 RX ORDER — ONDANSETRON 2 MG/ML
INJECTION INTRAMUSCULAR; INTRAVENOUS AS NEEDED
Status: DISCONTINUED | OUTPATIENT
Start: 2022-03-30 | End: 2022-03-30 | Stop reason: SURG

## 2022-03-30 RX ORDER — SODIUM CHLORIDE 9 MG/ML
9 INJECTION, SOLUTION INTRAVENOUS CONTINUOUS PRN
Status: DISCONTINUED | OUTPATIENT
Start: 2022-03-30 | End: 2022-03-30 | Stop reason: HOSPADM

## 2022-03-30 RX ORDER — EPHEDRINE SULFATE 50 MG/ML
INJECTION, SOLUTION INTRAVENOUS AS NEEDED
Status: DISCONTINUED | OUTPATIENT
Start: 2022-03-30 | End: 2022-03-30 | Stop reason: SURG

## 2022-03-30 RX ORDER — FENTANYL CITRATE 50 UG/ML
INJECTION, SOLUTION INTRAMUSCULAR; INTRAVENOUS AS NEEDED
Status: DISCONTINUED | OUTPATIENT
Start: 2022-03-30 | End: 2022-03-30 | Stop reason: SURG

## 2022-03-30 RX ORDER — MAGNESIUM HYDROXIDE 1200 MG/15ML
LIQUID ORAL AS NEEDED
Status: DISCONTINUED | OUTPATIENT
Start: 2022-03-30 | End: 2022-03-30 | Stop reason: HOSPADM

## 2022-03-30 RX ORDER — LIDOCAINE HYDROCHLORIDE 20 MG/ML
INJECTION, SOLUTION INFILTRATION; PERINEURAL AS NEEDED
Status: DISCONTINUED | OUTPATIENT
Start: 2022-03-30 | End: 2022-03-30 | Stop reason: SURG

## 2022-03-30 RX ORDER — ONDANSETRON 2 MG/ML
4 INJECTION INTRAMUSCULAR; INTRAVENOUS ONCE AS NEEDED
Status: DISCONTINUED | OUTPATIENT
Start: 2022-03-30 | End: 2022-03-30 | Stop reason: HOSPADM

## 2022-03-30 RX ORDER — OXYCODONE HYDROCHLORIDE 5 MG/1
5 TABLET ORAL
Status: DISCONTINUED | OUTPATIENT
Start: 2022-03-30 | End: 2022-03-30 | Stop reason: HOSPADM

## 2022-03-30 RX ORDER — ACETAMINOPHEN 500 MG
1000 TABLET ORAL ONCE
Status: COMPLETED | OUTPATIENT
Start: 2022-03-30 | End: 2022-03-30

## 2022-03-30 RX ORDER — CEFAZOLIN SODIUM 2 G/100ML
2 INJECTION, SOLUTION INTRAVENOUS ONCE
Status: COMPLETED | OUTPATIENT
Start: 2022-03-30 | End: 2022-03-30

## 2022-03-30 RX ORDER — HYDROCODONE BITARTRATE AND ACETAMINOPHEN 5; 325 MG/1; MG/1
1 TABLET ORAL EVERY 6 HOURS PRN
Qty: 28 TABLET | Refills: 0 | Status: SHIPPED | OUTPATIENT
Start: 2022-03-30 | End: 2022-04-06

## 2022-03-30 RX ORDER — METOCLOPRAMIDE HYDROCHLORIDE 5 MG/ML
10 INJECTION INTRAMUSCULAR; INTRAVENOUS ONCE AS NEEDED
Status: COMPLETED | OUTPATIENT
Start: 2022-03-30 | End: 2022-03-30

## 2022-03-30 RX ADMIN — EPHEDRINE SULFATE 10 MG: 50 INJECTION INTRAVENOUS at 16:38

## 2022-03-30 RX ADMIN — FENTANYL CITRATE 25 MCG: 50 INJECTION, SOLUTION INTRAMUSCULAR; INTRAVENOUS at 17:14

## 2022-03-30 RX ADMIN — ONDANSETRON 4 MG: 2 INJECTION INTRAMUSCULAR; INTRAVENOUS at 16:50

## 2022-03-30 RX ADMIN — FENTANYL CITRATE 25 MCG: 50 INJECTION, SOLUTION INTRAMUSCULAR; INTRAVENOUS at 16:25

## 2022-03-30 RX ADMIN — GLYCOPYRROLATE 0.1 MCG: 0.2 INJECTION INTRAMUSCULAR; INTRAVENOUS at 16:29

## 2022-03-30 RX ADMIN — PROPOFOL 120 MG: 10 INJECTION, EMULSION INTRAVENOUS at 16:14

## 2022-03-30 RX ADMIN — ACETAMINOPHEN 1000 MG: 500 TABLET ORAL at 12:27

## 2022-03-30 RX ADMIN — EPHEDRINE SULFATE 5 MG: 50 INJECTION INTRAVENOUS at 16:40

## 2022-03-30 RX ADMIN — METOCLOPRAMIDE HYDROCHLORIDE 10 MG: 5 INJECTION INTRAMUSCULAR; INTRAVENOUS at 15:57

## 2022-03-30 RX ADMIN — LIDOCAINE HYDROCHLORIDE 100 MG: 20 INJECTION, SOLUTION INFILTRATION; PERINEURAL at 16:14

## 2022-03-30 RX ADMIN — OXYCODONE HYDROCHLORIDE 5 MG: 5 TABLET ORAL at 18:28

## 2022-03-30 RX ADMIN — GLYCOPYRROLATE 0.1 MCG: 0.2 INJECTION INTRAMUSCULAR; INTRAVENOUS at 16:34

## 2022-03-30 RX ADMIN — DEXAMETHASONE SODIUM PHOSPHATE 4 MG: 4 INJECTION INTRA-ARTICULAR; INTRALESIONAL; INTRAMUSCULAR; INTRAVENOUS; SOFT TISSUE at 16:24

## 2022-03-30 RX ADMIN — FENTANYL CITRATE 25 MCG: 50 INJECTION, SOLUTION INTRAMUSCULAR; INTRAVENOUS at 16:50

## 2022-03-30 RX ADMIN — CEFAZOLIN SODIUM 2 G: 2 INJECTION, SOLUTION INTRAVENOUS at 16:19

## 2022-03-30 RX ADMIN — MIDAZOLAM HYDROCHLORIDE 2 MG: 1 INJECTION, SOLUTION INTRAMUSCULAR; INTRAVENOUS at 15:57

## 2022-03-30 RX ADMIN — SODIUM CHLORIDE 9 ML/HR: 9 INJECTION, SOLUTION INTRAVENOUS at 12:27

## 2022-03-30 RX ADMIN — FENTANYL CITRATE 25 MCG: 50 INJECTION, SOLUTION INTRAMUSCULAR; INTRAVENOUS at 16:14

## 2022-03-30 NOTE — ANESTHESIA POSTPROCEDURE EVALUATION
Patient: Del Cueva    Procedure Summary     Date: 03/30/22 Room / Location: [facility] OR 01 / [facility] MAIN OR    Anesthesia Start: 1609 Anesthesia Stop: 1757    Procedure: LEFT UPPER ARM BASILIC VEIN TRANSPOSITION (Left Arm Upper) Diagnosis:       Stage 4 chronic kidney disease (HCC)      (Stage 4 chronic kidney disease (HCC) [N18.4])    Surgeons: Rigo Palmer MD Provider: Oz Hyde MD    Anesthesia Type: general, MAC ASA Status: 4          Anesthesia Type: general, MAC    Vitals  Vitals Value Taken Time   /91 03/30/22 1806   Temp     Pulse 79 03/30/22 1811   Resp     SpO2 100 % 03/30/22 1811   Vitals shown include unvalidated device data.        Post Anesthesia Care and Evaluation    Patient location during evaluation: bedside  Patient participation: complete - patient participated  Level of consciousness: awake  Pain score: 0  Pain management: adequate  Airway patency: patent  Anesthetic complications: No anesthetic complications  PONV Status: none  Cardiovascular status: acceptable and stable  Respiratory status: acceptable and room air  Hydration status: acceptable    Comments: An Anesthesiologist personally participated in the most demanding procedures (including induction and emergence if applicable) in the anesthesia plan, monitored the course of anesthesia administration at frequent intervals and remained physically present and available for immediate diagnosis and treatment of emergencies.

## 2022-03-30 NOTE — ANESTHESIA PREPROCEDURE EVALUATION
Anesthesia Evaluation     Patient summary reviewed and Nursing notes reviewed   no history of anesthetic complications:  NPO Solid Status: > 8 hours  NPO Liquid Status: > 2 hours           Airway   Mallampati: II  TM distance: >3 FB  Neck ROM: full  No difficulty expected and Large neck circumference  Dental    (+) upper dentures and lower dentures    Pulmonary - normal exam    breath sounds clear to auscultation  (+) COPD, shortness of breath,   Cardiovascular - normal exam  Exercise tolerance: good (4-7 METS)    Rhythm: regular  Rate: normal    (+) hypertension, past MI , CAD, CABG, CHF , hyperlipidemia,       Neuro/Psych- negative ROS  GI/Hepatic/Renal/Endo    (+)   renal disease ESRD, diabetes mellitus type 2, thyroid problem     Musculoskeletal     Abdominal    Substance History - negative use     OB/GYN negative ob/gyn ROS         Other   arthritis,             <4 mets soae hx of cad sp cabg, nstemi sp 3 drug eluding stents 01/2021  echo 2021 fibrosclerosis of av, moderate mr ef 51%  ABNORMAL ECG -  Sinus rhythm  ST anbnormalities.  No previous ECG available for comparison     Latest Reference Range & Units 01/24/21 00:00   Hemoglobin 13.5 - 17.5 g/dL 8.9 (L) (E)   Hematocrit 41.0 - 53.0 % 27.7 (L) (E)   (L): Data is abnormally low  (E): External lab result    Await repeat labs           Anesthesia Plan    ASA 4     general and MAC   (Patient understands anesthesia not responsible for dental damage.)  intravenous induction     Anesthetic plan, all risks, benefits, and alternatives have been provided, discussed and informed consent has been obtained with: patient.  Use of blood products discussed with patient .   Plan discussed with CRNA.        CODE STATUS:

## 2022-04-05 ENCOUNTER — OFFICE VISIT (OUTPATIENT)
Dept: VASCULAR SURGERY | Facility: HOSPITAL | Age: 82
End: 2022-04-05

## 2022-04-05 VITALS
OXYGEN SATURATION: 97 % | DIASTOLIC BLOOD PRESSURE: 80 MMHG | HEART RATE: 91 BPM | SYSTOLIC BLOOD PRESSURE: 162 MMHG | RESPIRATION RATE: 18 BRPM | TEMPERATURE: 98 F

## 2022-04-05 DIAGNOSIS — Z98.890 S/P ARTERIOVENOUS (AV) FISTULA CREATION: ICD-10-CM

## 2022-04-05 DIAGNOSIS — N18.4 STAGE 4 CHRONIC KIDNEY DISEASE: Primary | ICD-10-CM

## 2022-04-05 PROCEDURE — 99024 POSTOP FOLLOW-UP VISIT: CPT | Performed by: NURSE PRACTITIONER

## 2022-04-05 PROCEDURE — G0463 HOSPITAL OUTPT CLINIC VISIT: HCPCS | Performed by: NURSE PRACTITIONER

## 2022-04-05 NOTE — PROGRESS NOTES
[facility] Vascular Surgery Office Follow Up Note     Date of Encounter: 04/05/2022     MRN Number: [id number]  Name: Del Cueva  Phone Number: [phone]     Referred By: Rigo Palmer MD  PCP: Vic Marrero DO    Chief Complaint:    Chief Complaint   Patient presents with   • ESRD     PATIENT IS HERE AS A FOLLOW UP WITH COMPLAINTS OF LEFT ARM SWELLING FOLLOWING CREATION OF LEFT ARM BASILIC VEIN TRANSPOSITION.        Subjective      History of Present Illness:    Del Cueva is a 81 y.o. male presents for concern of left arm swelling following a left arm basilic vein transposition performed by Dr. Palmer on 3/30/2022.  He is accompanied today by his daughter who explains that his arm was really bruised yesterday and has somewhat resolved today.  They were also concerned with swelling and and pain.  He states that it is not hurting at the moment but the pain was previously at a 8 out of 10.  No tingling or numbness in the hand with motor and sensory skills intact.  Mr. Velez has stage IV renal disease and is not on hemodialysis at this time.    Review of Systems:  Review of Systems   Constitutional: Negative.   HENT: Negative.    Cardiovascular: Negative.    Respiratory: Negative.    Skin:        Left upper arm: Healing surgical incision, swelling.   Musculoskeletal: Negative.    Gastrointestinal: Negative.    Neurological: Negative.    Psychiatric/Behavioral: Negative.        I have reviewed the following portions of the patient's history: allergies, current medications, past family history, past medical history, past social history, past surgical history and problem list and confirm it's accurate.    Allergies:  No Known Allergies    Medications:      Current Outpatient Medications:   •  aspirin 81 MG EC tablet, Take 81 mg by mouth Daily. PER DR PALMER/JARRED AT [facility] VASCULAR OK TO CONTINUE TAKING UP TO DAY OF SURGERY. MESSAGE LEFT FOR DAUGHTER ENRIQUE, Disp: , Rfl:   •   atorvastatin (LIPITOR) 40 MG tablet, Take 40 mg by mouth Daily., Disp: , Rfl:   •  bumetanide (BUMEX) 2 MG tablet, Take 2 mg by mouth 2 (Two) Times a Day., Disp: , Rfl:   •  clopidogrel (PLAVIX) 75 MG tablet, Take 75 mg by mouth Daily. PER DR ALTAMIRANO/JARRED AT [facility] VASCULAR OK TO CONTINUE TAKING UP TO DAY OF SURGERY. MESSAGE LEFT FOR DAUGHTER ENRIQUE, Disp: , Rfl:   •  finasteride (PROSCAR) 5 MG tablet, Take 5 mg by mouth Daily., Disp: , Rfl:   •  glipizide (GLUCOTROL) 5 MG tablet, Take 5 mg by mouth Daily. INSTRUCTED PER ANESTHESIA PROTOCOL, Disp: , Rfl:   •  HYDROcodone-acetaminophen (Norco) 5-325 MG per tablet, Take 1 tablet by mouth Every 6 (Six) Hours As Needed for Severe Pain  for up to 7 days., Disp: 28 tablet, Rfl: 0  •  levothyroxine (SYNTHROID, LEVOTHROID) 137 MCG tablet, Take 137 mcg by mouth Every Night., Disp: , Rfl:   •  metoprolol tartrate (LOPRESSOR) 25 MG tablet, Take 25 mg by mouth 2 (Two) Times a Day., Disp: , Rfl:   •  nateglinide (STARLIX) 60 MG tablet, Take 60 mg by mouth 3 (Three) Times a Day. INSTRUCTED PER ANESTHESIA PROTOCOL, Disp: , Rfl:   •  sodium bicarbonate 650 MG tablet, Take 1,300 mg by mouth 3 (Three) Times a Day., Disp: , Rfl:   •  tamsulosin (FLOMAX) 0.4 MG capsule 24 hr capsule, Take 0.4 mg by mouth Every Night., Disp: , Rfl:   •  vitamin D (ERGOCALCIFEROL) 1.25 MG (03231 UT) capsule capsule, Take 50,000 Units by mouth 1 (One) Time Per Week., Disp: , Rfl:     History:   Past Medical History:   Diagnosis Date   • Anemia    • Arthritis    • CHF (congestive heart failure) (HCC)    • Chronic renal disease, stage 4, severely decreased glomerular filtration rate (GFR) between 15-29 mL/min/1.73 square meter (HCC)    • COPD (chronic obstructive pulmonary disease) (HCC)     NO INHALERS OR NEBULIZER   • Coronary artery disease    • Diabetes mellitus (HCC)     BS NOT CHECKED DAILY   • Disease of thyroid gland    • Hyperlipidemia    • Hypertension    • MI (myocardial infarction) (HCC)     3  STENT PLACED 2021 THROUGH [facility]. FOLLOWED BY DR BETHEA   • SOB (shortness of breath)     REPORTS CHRONIC ISSUE HX OF COPD       Past Surgical History:   Procedure Laterality Date   • ARTERIOVENOUS FISTULA/SHUNT SURGERY Left 3/30/2022    Procedure: LEFT UPPER ARM BASILIC VEIN TRANSPOSITION;  Surgeon: Rigo Palmer MD;  Location: [facility];  Service: Vascular;  Laterality: Left;   • CORONARY ANGIOPLASTY WITH STENT PLACEMENT  01/15/2021   • CORONARY ARTERY BYPASS GRAFT      DAUGHTER REPORTS OVER 30 YEARS AGO   • SHOULDER ROTATOR CUFF REPAIR Left        Social History     Socioeconomic History   • Marital status:    • Number of children: 4   Tobacco Use   • Smoking status: Former Smoker     Quit date:      Years since quittin.2   • Smokeless tobacco: Current User     Types: Chew   • Tobacco comment: INSTRUCTED NONE 24 HOURS PRIOR TO PROCEDURE   Vaping Use   • Vaping Use: Never used   Substance and Sexual Activity   • Alcohol use: Never   • Drug use: Never   • Sexual activity: Defer        History reviewed. No pertinent family history.    Objective     Physical Exam:  Vitals:    22 0855   BP: 162/80   BP Location: Right arm   Patient Position: Sitting   Cuff Size: Large Adult   Pulse: 91   Resp: 18   Temp: 98 °F (36.7 °C)   TempSrc: Temporal   SpO2: 97%   PainSc:   4   PainLoc: Arm      There is no height or weight on file to calculate BMI.    Physical Exam  Constitutional:       Appearance: Normal appearance.   HENT:      Head: Normocephalic.   Cardiovascular:      Rate and Rhythm: Normal rate.      Pulses: Normal pulses.   Pulmonary:      Effort: Pulmonary effort is normal.   Musculoskeletal:         General: Normal range of motion.   Skin:     General: Skin is warm and dry.      Capillary Refill: Capillary refill takes less than 2 seconds.      Comments: Left upper arm: Well approximated surgical incision, swelling, mild ecchymosis.  Motor and sensory skills, intact no signs or  symptoms of infection.  Excellent thrill and bruit.   Neurological:      General: No focal deficit present.      Mental Status: He is alert and oriented to person, place, and time.   Psychiatric:         Mood and Affect: Mood normal.         Behavior: Behavior normal.               Assessment / Plan      Assessment / Plan:  There are no diagnoses linked to this encounter.   Mr. Velez is making satisfactory progress following a left arm basilic vein transposition performed by Dr. Palmer on 3/30/2022.  He does have some swelling and minor bruising with excellent thrill and bruit along with motor and sensory skills intact.  I have explained to him and his daughter that swelling is to be expected the first couple weeks and should resolve with time.  I have educated on the signs and symptoms of infection and possible complications that would warrant concern.  I recommend we lightly wrap the left arm from the hand to elbow to help minimize swelling and to keep the scheduled appointment with Dr. Palmer on April 19.  However, should he develop any further concerns or worsening signs or symptoms he may follow-up sooner.    Thank you for allowing me to participate in your patient's care.    Patient Education:      Follow Up:   No follow-ups on file.   Or sooner for any further concerns or worsening sign and symptoms. If unable to reach us in the office please dial 911 or go to the nearest emergency department.      Cielo Buckley APRHERNÁN  [facility] Vascular Surgery

## 2022-04-19 ENCOUNTER — OFFICE VISIT (OUTPATIENT)
Dept: VASCULAR SURGERY | Facility: HOSPITAL | Age: 82
End: 2022-04-19

## 2022-04-19 VITALS
SYSTOLIC BLOOD PRESSURE: 150 MMHG | DIASTOLIC BLOOD PRESSURE: 80 MMHG | TEMPERATURE: 98.2 F | RESPIRATION RATE: 18 BRPM | OXYGEN SATURATION: 98 % | HEART RATE: 75 BPM

## 2022-04-19 DIAGNOSIS — N18.4 STAGE 4 CHRONIC KIDNEY DISEASE: Primary | ICD-10-CM

## 2022-04-19 PROCEDURE — G0463 HOSPITAL OUTPT CLINIC VISIT: HCPCS | Performed by: SURGERY

## 2022-04-19 PROCEDURE — 99024 POSTOP FOLLOW-UP VISIT: CPT | Performed by: SURGERY

## 2022-04-19 NOTE — PROGRESS NOTES
[facility]   Follow up Office    Patient Name: Del Cueva  : 1940  MRN: [id number]  Primary Care Physician:  Vic Marrero DO      Subjective   Subjective     HPI:    Del Cueva is a 81 y.o. male who presents for follow-up.  He underwent left upper arm basilic vein transposition 3 weeks ago.  Doing well.  He has swelling in the left arm but it does not bother him.      Objective     Vitals:   Temp:  [98.2 °F (36.8 °C)] 98.2 °F (36.8 °C)  Heart Rate:  [75] 75  Resp:  [18] 18  BP: (150)/(80) 150/80    Physical Exam          Physical Exam  Constitutional:       Appearance: Normal appearance.   HENT:      Head: Normocephalic and atraumatic.   Eyes:      Extraocular Movements: Extraocular movements intact.      Pupils: Pupils are equal, round, and reactive to light.   Cardiovascular:      Rate and Rhythm: Normal rate and regular rhythm.      Arteriovenous access: left arteriovenous access is present.     Comments:   Excellent thrill of the left arm AV fistula.  Moderate swelling of the left upper extremity  Pulmonary:      Effort: Pulmonary effort is normal.      Breath sounds: Normal breath sounds.   Abdominal:      General: Abdomen is flat. Bowel sounds are normal.      Palpations: Abdomen is soft.   Musculoskeletal:      Cervical back: Normal range of motion and neck supple.   Skin:     General: Skin is warm and dry.   Neurological:      Mental Status: He is alert.         Assessment/Plan   Assessment / Plan     Diagnoses and all orders for this visit:    1. Stage 4 chronic kidney disease (HCC) (Primary)       Assessment/Plan:   Doing well after left arm AV fistula placement.  Patient seems to be maturing well.  Follow-up in 6 weeks.    Thank you for allowing me to take care of your patient.        Electronically signed by Rigo Palmer MD, MD, 22, 1:53 PM EDT.

## 2022-06-14 ENCOUNTER — OFFICE VISIT (OUTPATIENT)
Dept: VASCULAR SURGERY | Facility: HOSPITAL | Age: 82
End: 2022-06-14

## 2022-06-14 VITALS
SYSTOLIC BLOOD PRESSURE: 150 MMHG | TEMPERATURE: 97.6 F | HEART RATE: 86 BPM | RESPIRATION RATE: 18 BRPM | DIASTOLIC BLOOD PRESSURE: 68 MMHG | OXYGEN SATURATION: 98 %

## 2022-06-14 DIAGNOSIS — N18.4 STAGE 4 CHRONIC KIDNEY DISEASE: Primary | ICD-10-CM

## 2022-06-14 DIAGNOSIS — Z98.890 S/P ARTERIOVENOUS (AV) FISTULA CREATION: ICD-10-CM

## 2022-06-14 PROCEDURE — 99024 POSTOP FOLLOW-UP VISIT: CPT | Performed by: NURSE PRACTITIONER

## 2022-06-14 PROCEDURE — G0463 HOSPITAL OUTPT CLINIC VISIT: HCPCS | Performed by: NURSE PRACTITIONER

## 2022-06-14 NOTE — PROGRESS NOTES
[facility] Vascular Surgery Office Follow Up Note     Date of Encounter: 06/14/2022     MRN Number: [id number]  Name: Del Cueva  Phone Number: [phone]     Referred By: Rigo Palmer MD  PCP: Vic Marrero DO    Chief Complaint:    Chief Complaint   Patient presents with   • Follow-up     PATIENT IS HERE AS A THREE MONTH FOLLOW UP S/P CREATION OF LEFT ARM ARTERIOVENOUS FISTULA       Subjective      History of Present Illness:    Del Cueva is a 81 y.o. male presents for follow-up for left upper arm basilic vein transposition that was performed on 3/30/2022 by Dr. Palmer.  He denies any pain however he does have a small area of his concerned that the swelling on the inner aspect of the leg.  The surgical incision is well-healed,  motor and sensory intact.  He is very hard of hearing.  No other complaints at this time.  He is accompanied today by his daughter.    Review of Systems:  Review of Systems   Constitutional: Negative.   HENT: Negative.    Cardiovascular: Negative.    Respiratory: Negative.    Skin: Negative.    Musculoskeletal: Negative.    Gastrointestinal: Negative.    Neurological: Negative.    Psychiatric/Behavioral: Negative.      I have reviewed the following portions of the patient's history: allergies, current medications, past family history, past medical history, past social history, past surgical history and problem list and confirm it's accurate.    Allergies:  No Known Allergies    Medications:      Current Outpatient Medications:   •  aspirin 81 MG EC tablet, Take 81 mg by mouth Daily. PER DR PALMER/JARRED AT [facility] VASCULAR OK TO CONTINUE TAKING UP TO DAY OF SURGERY. MESSAGE LEFT FOR DAUGHTER ENRIQUE, Disp: , Rfl:   •  atorvastatin (LIPITOR) 40 MG tablet, Take 40 mg by mouth Daily., Disp: , Rfl:   •  bumetanide (BUMEX) 2 MG tablet, Take 2 mg by mouth 2 (Two) Times a Day., Disp: , Rfl:   •  clopidogrel (PLAVIX) 75 MG tablet, Take 75 mg by mouth Daily. PER   ADARSH/JARRED AT [facility] VASCULAR OK TO CONTINUE TAKING UP TO DAY OF SURGERY. MESSAGE LEFT FOR DAUGHTER ENRIQUE, Disp: , Rfl:   •  finasteride (PROSCAR) 5 MG tablet, Take 5 mg by mouth Daily., Disp: , Rfl:   •  glipizide (GLUCOTROL) 5 MG tablet, Take 5 mg by mouth Daily. INSTRUCTED PER ANESTHESIA PROTOCOL, Disp: , Rfl:   •  levothyroxine (SYNTHROID, LEVOTHROID) 137 MCG tablet, Take 137 mcg by mouth Every Night., Disp: , Rfl:   •  metoprolol tartrate (LOPRESSOR) 25 MG tablet, Take 25 mg by mouth 2 (Two) Times a Day., Disp: , Rfl:   •  nateglinide (STARLIX) 60 MG tablet, Take 60 mg by mouth 3 (Three) Times a Day. INSTRUCTED PER ANESTHESIA PROTOCOL, Disp: , Rfl:   •  sodium bicarbonate 650 MG tablet, Take 1,300 mg by mouth 3 (Three) Times a Day., Disp: , Rfl:   •  tamsulosin (FLOMAX) 0.4 MG capsule 24 hr capsule, Take 0.4 mg by mouth Every Night., Disp: , Rfl:   •  vitamin D (ERGOCALCIFEROL) 1.25 MG (39456 UT) capsule capsule, Take 50,000 Units by mouth 1 (One) Time Per Week., Disp: , Rfl:     History:   Past Medical History:   Diagnosis Date   • Anemia    • Arthritis    • CHF (congestive heart failure) ([facility])    • Chronic renal disease, stage 4, severely decreased glomerular filtration rate (GFR) between 15-29 mL/min/1.73 square meter (HCC)    • COPD (chronic obstructive pulmonary disease) ([facility])     NO INHALERS OR NEBULIZER   • Coronary artery disease    • Diabetes mellitus (HCC)     BS NOT CHECKED DAILY   • Disease of thyroid gland    • Hyperlipidemia    • Hypertension    • MI (myocardial infarction) (HCC)     3 STENT PLACED JAN 2021 THROUGH [facility]. FOLLOWED BY DR BETHEA   • SOB (shortness of breath)     REPORTS CHRONIC ISSUE HX OF COPD       Past Surgical History:   Procedure Laterality Date   • ARTERIOVENOUS FISTULA/SHUNT SURGERY Left 3/30/2022    Procedure: LEFT UPPER ARM BASILIC VEIN TRANSPOSITION;  Surgeon: Rigo Palmer MD;  Location: [facility] OR;  Service: Vascular;  Laterality: Left;   • CORONARY  ANGIOPLASTY WITH STENT PLACEMENT  01/15/2021   • CORONARY ARTERY BYPASS GRAFT      DAUGHTER REPORTS OVER 30 YEARS AGO   • SHOULDER ROTATOR CUFF REPAIR Left        Social History     Socioeconomic History   • Marital status:    • Number of children: 4   Tobacco Use   • Smoking status: Former Smoker     Quit date: 1980     Years since quittin.4   • Smokeless tobacco: Current User     Types: Chew   • Tobacco comment: INSTRUCTED NONE 24 HOURS PRIOR TO PROCEDURE   Vaping Use   • Vaping Use: Never used   Substance and Sexual Activity   • Alcohol use: Never   • Drug use: Never   • Sexual activity: Defer        History reviewed. No pertinent family history.    Objective     Physical Exam:  Vitals:    22 0942   BP: 150/68   BP Location: Right leg   Patient Position: Sitting   Cuff Size: Large Adult   Pulse: 86   Resp: 18   Temp: 97.6 °F (36.4 °C)   TempSrc: Temporal   SpO2: 98%   PainSc: 0-No pain      There is no height or weight on file to calculate BMI.    Physical Exam  Constitutional:       Appearance: Normal appearance.   HENT:      Head: Normocephalic.   Cardiovascular:      Rate and Rhythm: Normal rate.      Pulses: Normal pulses.      Comments: Left arm: Excellent palpable and audible thrill and bruit.   Pulmonary:      Effort: Pulmonary effort is normal.   Musculoskeletal:         General: Normal range of motion.      Cervical back: Normal range of motion.   Skin:     General: Skin is warm and dry.      Capillary Refill: Capillary refill takes less than 2 seconds.      Comments:Left arm: palpable, fluctuant area approximately 3X3 cm.   Neurological:      General: No focal deficit present.      Mental Status: He is alert and oriented to person, place, and time.   Psychiatric:         Mood and Affect: Mood normal.         Behavior: Behavior normal.        Assessment / Plan      Assessment / Plan:  Diagnoses and all orders for this visit:    1. Stage 4 chronic kidney disease (HCC) (Primary)    2. S/P  arteriovenous (AV) fistula creation       Mr. Velez had a left upper arm basilic vein transposition created by Dr. Palmer on 3/30/2022.  Has an excellent thrill and bruit that can be followed throughout the upper portion of the arm however, he has a palpable, fluctuant knot on the medial aspect antecubital.  No intervention indicated at this time. Mr. Cueva is not on dialysis at this time but the fistula is ready to be accessed when needed.  Since Mr. Cueva is not yet on dialysis, I recommend a follow-up in 3 months to revaluate.    Thank you for allowing me to participate in your patient's care.        Follow Up:   No follow-ups on file.   Or sooner for any further concerns or worsening sign and symptoms. If unable to reach us in the office please dial 911 or go to the nearest emergency department.      Cielo ARANGO  [facility] Vascular Surgery

## 2022-11-15 ENCOUNTER — OFFICE VISIT (OUTPATIENT)
Dept: VASCULAR SURGERY | Facility: HOSPITAL | Age: 82
End: 2022-11-15

## 2022-11-15 VITALS
RESPIRATION RATE: 16 BRPM | TEMPERATURE: 98.3 F | DIASTOLIC BLOOD PRESSURE: 60 MMHG | OXYGEN SATURATION: 97 % | HEART RATE: 91 BPM | SYSTOLIC BLOOD PRESSURE: 130 MMHG

## 2022-11-15 DIAGNOSIS — Z98.890 S/P ARTERIOVENOUS (AV) FISTULA CREATION: Primary | ICD-10-CM

## 2022-11-15 DIAGNOSIS — N18.6 END STAGE RENAL DISEASE ON DIALYSIS: ICD-10-CM

## 2022-11-15 DIAGNOSIS — Z99.2 END STAGE RENAL DISEASE ON DIALYSIS: ICD-10-CM

## 2022-11-15 PROCEDURE — 99212 OFFICE O/P EST SF 10 MIN: CPT | Performed by: NURSE PRACTITIONER

## 2022-11-15 PROCEDURE — G0463 HOSPITAL OUTPT CLINIC VISIT: HCPCS | Performed by: NURSE PRACTITIONER

## 2022-11-15 PROCEDURE — G0463 HOSPITAL OUTPT CLINIC VISIT: HCPCS

## 2022-11-15 NOTE — PROGRESS NOTES
[facility] Vascular Surgery Office Follow Up Note     Date of Encounter: 11/15/2022     MRN Number: [id number]  Name: Del Cueva  Phone Number: [phone]     Referred By: Cielo Buckley APRN  PCP: Vic Marrero DO    Chief Complaint:    Chief Complaint   Patient presents with   • Follow-up     LEFT ARM FISTULA FOLLOW UP/ HAVING NO PROBLEMS WITH DIALYSIS PER DAUGHTER THAT IS WITH THE PATIENT TODAY       Subjective      History of Present Illness:    Del Cueva is a 81 y.o. male follow-up after left upper arm transposition basilic vein performed by Dr. Palmer in March 2022.  At that time he was not on hemodialysis but is currently receiving 2 times a week.  He denies any arm pain or weakness.  He has an excellent thrill and bruit.  He is accompanied by his daughter today and has no other complaints at this time    Review of Systems:  ROS  Review of Systems   Constitutional: Negative.   HENT: Negative.    Cardiovascular: Negative.    Respiratory: Negative.    Skin:  Left upper arm fistula.    Musculoskeletal: Negative.    Gastrointestinal: Negative.    Neurological: Negative.    Psychiatric/Behavioral: Negative.      I have reviewed the following portions of the patient's history: allergies, current medications, past family history, past medical history, past social history, past surgical history and problem list and confirm it's accurate.    Allergies:  No Known Allergies    Medications:      Current Outpatient Medications:   •  aspirin 81 MG EC tablet, Take 81 mg by mouth Daily. PER DR PALMER/JARRED AT [facility] VASCULAR OK TO CONTINUE TAKING UP TO DAY OF SURGERY. MESSAGE LEFT FOR DAUGHTER ENRIQUE, Disp: , Rfl:   •  atorvastatin (LIPITOR) 40 MG tablet, Take 40 mg by mouth Daily., Disp: , Rfl:   •  bumetanide (BUMEX) 2 MG tablet, Take 2 mg by mouth 2 (Two) Times a Day., Disp: , Rfl:   •  clopidogrel (PLAVIX) 75 MG tablet, Take 75 mg by mouth Daily. PER DR PALMER/JARRED AT [facility] VASCULAR OK TO  CONTINUE TAKING UP TO DAY OF SURGERY. MESSAGE LEFT FOR DAUGHTER ENRIQUE, Disp: , Rfl:   •  finasteride (PROSCAR) 5 MG tablet, Take 5 mg by mouth Daily., Disp: , Rfl:   •  glipizide (GLUCOTROL) 5 MG tablet, Take 5 mg by mouth Daily. INSTRUCTED PER ANESTHESIA PROTOCOL, Disp: , Rfl:   •  levothyroxine (SYNTHROID, LEVOTHROID) 137 MCG tablet, Take 137 mcg by mouth Every Night., Disp: , Rfl:   •  metoprolol tartrate (LOPRESSOR) 25 MG tablet, Take 25 mg by mouth 2 (Two) Times a Day., Disp: , Rfl:   •  nateglinide (STARLIX) 60 MG tablet, Take 60 mg by mouth 3 (Three) Times a Day. INSTRUCTED PER ANESTHESIA PROTOCOL, Disp: , Rfl:   •  sodium bicarbonate 650 MG tablet, Take 1,300 mg by mouth 3 (Three) Times a Day., Disp: , Rfl:   •  tamsulosin (FLOMAX) 0.4 MG capsule 24 hr capsule, Take 0.4 mg by mouth Every Night., Disp: , Rfl:   •  vitamin D (ERGOCALCIFEROL) 1.25 MG (31289 UT) capsule capsule, Take 50,000 Units by mouth 1 (One) Time Per Week., Disp: , Rfl:     History:   Past Medical History:   Diagnosis Date   • Anemia    • Arthritis    • CHF (congestive heart failure) ([facility])    • Chronic renal disease, stage 4, severely decreased glomerular filtration rate (GFR) between 15-29 mL/min/1.73 square meter (HCC)    • COPD (chronic obstructive pulmonary disease) ([facility])     NO INHALERS OR NEBULIZER   • Coronary artery disease    • Diabetes mellitus (HCC)     BS NOT CHECKED DAILY   • Disease of thyroid gland    • Hyperlipidemia    • Hypertension    • MI (myocardial infarction) (HCC)     3 STENT PLACED JAN 2021 THROUGH [facility]. FOLLOWED BY DR BETHEA   • SOB (shortness of breath)     REPORTS CHRONIC ISSUE HX OF COPD       Past Surgical History:   Procedure Laterality Date   • ARTERIOVENOUS FISTULA/SHUNT SURGERY Left 3/30/2022    Procedure: LEFT UPPER ARM BASILIC VEIN TRANSPOSITION;  Surgeon: Rigo Palmer MD;  Location: [facility];  Service: Vascular;  Laterality: Left;   • CORONARY ANGIOPLASTY WITH STENT PLACEMENT  01/15/2021    • CORONARY ARTERY BYPASS GRAFT      DAUGHTER REPORTS OVER 30 YEARS AGO   • SHOULDER ROTATOR CUFF REPAIR Left        Social History     Socioeconomic History   • Marital status:    • Number of children: 4   Tobacco Use   • Smoking status: Former     Types: Cigarettes     Quit date: 1987     Years since quittin.8   • Smokeless tobacco: Current     Types: Chew   • Tobacco comments:     INSTRUCTED NONE 24 HOURS PRIOR TO PROCEDURE   Vaping Use   • Vaping Use: Never used   Substance and Sexual Activity   • Alcohol use: Never   • Drug use: Never   • Sexual activity: Defer        No family history on file.    Objective     Physical Exam:  Vitals:    11/15/22 0927   BP: 130/60   BP Location: Right arm   Patient Position: Sitting   Cuff Size: Adult   Pulse: 91   Resp: 16   Temp: 98.3 °F (36.8 °C)   TempSrc: Temporal   SpO2: 97%   PainSc: 0-No pain      There is no height or weight on file to calculate BMI.    Physical Exam  Physical Exam  Constitutional:       Appearance: Normal appearance.   HENT:      Head: Normocephalic.   Cardiovascular:      Rate and Rhythm: Normal rate.      Pulses: Normal pulses.      Comments:   Pulmonary:      Effort: Pulmonary effort is normal.   Musculoskeletal:         General: Normal range of motion.      Cervical back: Normal range of motion.   Skin:     General: Skin is warm and dry.      Capillary Refill: Capillary refill takes less than 2 seconds.      Comments: Left upper arm: Well-healed surgical incision, skin intact, no erythema and palpable thrill and bruit.  +2 palpable radial pulse.  Neurological:      General: No focal deficit present.      Mental Status: Alert and oriented to person, place, and time.   Psychiatric:         Mood and Affect: Mood normal.         Behavior: Behavior normal.          Assessment / Plan      Assessment / Plan:  Diagnoses and all orders for this visit:    1. S/P arteriovenous (AV) fistula creation (Primary)    2. End stage renal disease on  dialysis ([facility])       Mr. Velez has a left upper arm basilic vein transposition performed Dr. Palmer on 3/30/2022, however at the time of his last follow-up he was not receiving hemodialysis.  He is now on hemodialysis 2 times a week and they have been using the fistula without complications.  He has an excellent thrill and bruit, +2 radial pulse with motor and neuro intact.  He may follow-up as needed.  I have answered all the questions and they are in agreement with plan at this time.    Thank you for allowing me to participate in your patient's care.  Patient Education:        Follow Up:   No follow-ups on file.   Or sooner for any further concerns or worsening sign and symptoms. If unable to reach us in the office please dial 911 or go to the nearest emergency department.      Cielo ARANGO  [facility] Vascular Surgery

## 2023-05-10 ENCOUNTER — OFFICE VISIT (OUTPATIENT)
Dept: VASCULAR SURGERY | Facility: HOSPITAL | Age: 83
End: 2023-05-10
Payer: MEDICARE

## 2023-05-10 VITALS
SYSTOLIC BLOOD PRESSURE: 150 MMHG | TEMPERATURE: 97.6 F | HEART RATE: 96 BPM | DIASTOLIC BLOOD PRESSURE: 82 MMHG | OXYGEN SATURATION: 99 % | RESPIRATION RATE: 18 BRPM

## 2023-05-10 DIAGNOSIS — N18.6 END STAGE RENAL DISEASE ON DIALYSIS: Primary | ICD-10-CM

## 2023-05-10 DIAGNOSIS — Z99.2 END STAGE RENAL DISEASE ON DIALYSIS: Primary | ICD-10-CM

## 2023-05-10 PROCEDURE — G0463 HOSPITAL OUTPT CLINIC VISIT: HCPCS | Performed by: SURGERY

## 2023-05-10 RX ORDER — CEFAZOLIN SODIUM 2 G/100ML
2 INJECTION, SOLUTION INTRAVENOUS ONCE
OUTPATIENT
Start: 2023-05-10 | End: 2023-05-10

## 2023-05-10 NOTE — PROGRESS NOTES
[facility]   Follow up Office    Patient Name: Del Cueva  : 1940  MRN: [id number]  Primary Care Physician:  Provider, No Known      Subjective   Subjective     HPI:    Del Cueva is a 82 y.o. male who had a left basilic vein transposition created on 2022.  He started dialysis later that year.  There has been some difficulty with the fistula recently he comes to see me for further evaluation from the vascular standpoint.      Objective     Vitals:   Temp:  [97.6 °F (36.4 °C)] 97.6 °F (36.4 °C)  Heart Rate:  [96] 96  Resp:  [18] 18  BP: (150)/(82) 150/82    Physical Exam      General: Alert, no acute distress  Left arm: AV fistula consistent with a basilic vein transposition with moderate bruit and thrill with evidence of slight hyper pulsatility.    Assessment & Plan   Assessment / Plan     Diagnoses and all orders for this visit:    1. End stage renal disease on dialysis (Primary)  -     Case Request; Standing  -     ceFAZolin in dextrose (ANCEF) IVPB solution 2 g  -     Case Request    Other orders  -     Follow Anesthesia Guidelines / Protocol; Future  -     Follow Anesthesia Guidelines / Protocol; Standing  -     Verify NPO Status; Standing  -     Obtain Informed Consent; Standing  -     CBC & Differential; Standing  -     Basic Metabolic Panel; Standing       Assessment/Plan:   Mr. Cueva has a left arm fistula which is functioning poorly.  I am recommending that we proceed to a fistulogram.  I have discussed with the patient in detail the mechanics of the procedure, the indications, benefits, risks, alternatives as well as potential complications to include but not limited to infection, bleeding, inability to improve the fistula, vascular injury requiring surgical repair.  He appears to understand and desires to proceed.        Electronically signed by Ramses Rojas MD, 05/10/23, 8:40 AM EDT.

## 2023-05-15 ENCOUNTER — HOSPITAL ENCOUNTER (INPATIENT)
Facility: HOSPITAL | Age: 83
LOS: 2 days | Discharge: HOME | DRG: 314 | End: 2023-05-19
Attending: EMERGENCY MEDICINE | Admitting: INTERNAL MEDICINE
Payer: MEDICARE

## 2023-05-15 DIAGNOSIS — T82.590A MALFUNCTION OF ARTERIOVENOUS DIALYSIS FISTULA, INITIAL ENCOUNTER: Primary | ICD-10-CM

## 2023-05-15 LAB
ANION GAP SERPL CALCULATED.3IONS-SCNC: 12.2 MMOL/L (ref 5–15)
BACTERIA UR QL AUTO: ABNORMAL /HPF
BASOPHILS # BLD AUTO: 0.08 10*3/MM3 (ref 0–0.2)
BASOPHILS NFR BLD AUTO: 1.1 % (ref 0–1.5)
BILIRUB UR QL STRIP: NEGATIVE
BUN SERPL-MCNC: 54 MG/DL (ref 8–23)
BUN/CREAT SERPL: 10.8 (ref 7–25)
CALCIUM SPEC-SCNC: 9 MG/DL (ref 8.6–10.5)
CHLORIDE SERPL-SCNC: 103 MMOL/L (ref 98–107)
CLARITY UR: CLEAR
CO2 SERPL-SCNC: 21.8 MMOL/L (ref 22–29)
COLOR UR: YELLOW
CREAT SERPL-MCNC: 5.02 MG/DL (ref 0.76–1.27)
DEPRECATED RDW RBC AUTO: 54.9 FL (ref 37–54)
EGFRCR SERPLBLD CKD-EPI 2021: 10.9 ML/MIN/1.73
EOSINOPHIL # BLD AUTO: 0.33 10*3/MM3 (ref 0–0.4)
EOSINOPHIL NFR BLD AUTO: 4.6 % (ref 0.3–6.2)
ERYTHROCYTE [DISTWIDTH] IN BLOOD BY AUTOMATED COUNT: 16.4 % (ref 12.3–15.4)
GLUCOSE BLDC GLUCOMTR-MCNC: 100 MG/DL (ref 70–99)
GLUCOSE SERPL-MCNC: 111 MG/DL (ref 65–99)
GLUCOSE UR STRIP-MCNC: ABNORMAL MG/DL
HCT VFR BLD AUTO: 33.9 % (ref 37.5–51)
HGB BLD-MCNC: 11.3 G/DL (ref 13–17.7)
HGB UR QL STRIP.AUTO: NEGATIVE
HYALINE CASTS UR QL AUTO: ABNORMAL /LPF
IMM GRANULOCYTES # BLD AUTO: 0.05 10*3/MM3 (ref 0–0.05)
IMM GRANULOCYTES NFR BLD AUTO: 0.7 % (ref 0–0.5)
KETONES UR QL STRIP: NEGATIVE
LEUKOCYTE ESTERASE UR QL STRIP.AUTO: NEGATIVE
LYMPHOCYTES # BLD AUTO: 0.96 10*3/MM3 (ref 0.7–3.1)
LYMPHOCYTES NFR BLD AUTO: 13.3 % (ref 19.6–45.3)
MCH RBC QN AUTO: 32.2 PG (ref 26.6–33)
MCHC RBC AUTO-ENTMCNC: 33.3 G/DL (ref 31.5–35.7)
MCV RBC AUTO: 96.6 FL (ref 79–97)
MONOCYTES # BLD AUTO: 0.66 10*3/MM3 (ref 0.1–0.9)
MONOCYTES NFR BLD AUTO: 9.2 % (ref 5–12)
NEUTROPHILS NFR BLD AUTO: 5.12 10*3/MM3 (ref 1.7–7)
NEUTROPHILS NFR BLD AUTO: 71.1 % (ref 42.7–76)
NITRITE UR QL STRIP: NEGATIVE
NRBC BLD AUTO-RTO: 0 /100 WBC (ref 0–0.2)
PH UR STRIP.AUTO: 5.5 [PH] (ref 5–8)
PLATELET # BLD AUTO: 194 10*3/MM3 (ref 140–450)
PMV BLD AUTO: 10.7 FL (ref 6–12)
POTASSIUM SERPL-SCNC: 5.1 MMOL/L (ref 3.5–5.2)
PROT UR QL STRIP: ABNORMAL
RBC # BLD AUTO: 3.51 10*6/MM3 (ref 4.14–5.8)
RBC # UR STRIP: ABNORMAL /HPF
REF LAB TEST METHOD: ABNORMAL
SODIUM SERPL-SCNC: 137 MMOL/L (ref 136–145)
SP GR UR STRIP: 1.01 (ref 1–1.03)
SQUAMOUS #/AREA URNS HPF: ABNORMAL /HPF
UROBILINOGEN UR QL STRIP: ABNORMAL
WBC # UR STRIP: ABNORMAL /HPF
WBC NRBC COR # BLD: 7.2 10*3/MM3 (ref 3.4–10.8)
WHOLE BLOOD HOLD COAG: NORMAL

## 2023-05-15 PROCEDURE — 99222 1ST HOSP IP/OBS MODERATE 55: CPT | Performed by: INTERNAL MEDICINE

## 2023-05-15 PROCEDURE — 36415 COLL VENOUS BLD VENIPUNCTURE: CPT

## 2023-05-15 PROCEDURE — 25010000002 HEPARIN (PORCINE) PER 1000 UNITS: Performed by: INTERNAL MEDICINE

## 2023-05-15 PROCEDURE — 81001 URINALYSIS AUTO W/SCOPE: CPT | Performed by: EMERGENCY MEDICINE

## 2023-05-15 PROCEDURE — 82948 REAGENT STRIP/BLOOD GLUCOSE: CPT

## 2023-05-15 PROCEDURE — 99222 1ST HOSP IP/OBS MODERATE 55: CPT | Performed by: SURGERY

## 2023-05-15 PROCEDURE — 93005 ELECTROCARDIOGRAM TRACING: CPT | Performed by: EMERGENCY MEDICINE

## 2023-05-15 PROCEDURE — G0378 HOSPITAL OBSERVATION PER HR: HCPCS

## 2023-05-15 PROCEDURE — 99285 EMERGENCY DEPT VISIT HI MDM: CPT

## 2023-05-15 PROCEDURE — 85025 COMPLETE CBC W/AUTO DIFF WBC: CPT | Performed by: EMERGENCY MEDICINE

## 2023-05-15 PROCEDURE — 94799 UNLISTED PULMONARY SVC/PX: CPT

## 2023-05-15 PROCEDURE — 80048 BASIC METABOLIC PNL TOTAL CA: CPT | Performed by: EMERGENCY MEDICINE

## 2023-05-15 RX ORDER — TAMSULOSIN HYDROCHLORIDE 0.4 MG/1
0.4 CAPSULE ORAL NIGHTLY
Status: DISCONTINUED | OUTPATIENT
Start: 2023-05-15 | End: 2023-05-19

## 2023-05-15 RX ORDER — CLOPIDOGREL BISULFATE 75 MG/1
75 TABLET ORAL DAILY
Status: DISCONTINUED | OUTPATIENT
Start: 2023-05-15 | End: 2023-05-19 | Stop reason: HOSPADM

## 2023-05-15 RX ORDER — ACETAMINOPHEN 325 MG/1
650 TABLET ORAL EVERY 4 HOURS PRN
Status: DISCONTINUED | OUTPATIENT
Start: 2023-05-15 | End: 2023-05-19 | Stop reason: HOSPADM

## 2023-05-15 RX ORDER — FERROUS SULFATE 325(65) MG
1 TABLET ORAL DAILY
COMMUNITY
Start: 2023-03-06

## 2023-05-15 RX ORDER — HEPARIN SODIUM 5000 [USP'U]/ML
5000 INJECTION, SOLUTION INTRAVENOUS; SUBCUTANEOUS EVERY 12 HOURS SCHEDULED
Status: DISCONTINUED | OUTPATIENT
Start: 2023-05-15 | End: 2023-05-19

## 2023-05-15 RX ORDER — SODIUM BICARBONATE 650 MG/1
1300 TABLET ORAL 3 TIMES DAILY
Status: DISCONTINUED | OUTPATIENT
Start: 2023-05-15 | End: 2023-05-19 | Stop reason: HOSPADM

## 2023-05-15 RX ORDER — SODIUM CHLORIDE 9 MG/ML
40 INJECTION, SOLUTION INTRAVENOUS AS NEEDED
Status: DISCONTINUED | OUTPATIENT
Start: 2023-05-15 | End: 2023-05-19 | Stop reason: HOSPADM

## 2023-05-15 RX ORDER — BUMETANIDE 1 MG/1
2 TABLET ORAL 2 TIMES DAILY
Status: DISCONTINUED | OUTPATIENT
Start: 2023-05-15 | End: 2023-05-19 | Stop reason: HOSPADM

## 2023-05-15 RX ORDER — DEXTROSE MONOHYDRATE 25 G/50ML
25 INJECTION, SOLUTION INTRAVENOUS
Status: DISCONTINUED | OUTPATIENT
Start: 2023-05-15 | End: 2023-05-19 | Stop reason: HOSPADM

## 2023-05-15 RX ORDER — LEVOTHYROXINE SODIUM 0.15 MG/1
150 TABLET ORAL
Status: DISCONTINUED | OUTPATIENT
Start: 2023-05-16 | End: 2023-05-19 | Stop reason: HOSPADM

## 2023-05-15 RX ORDER — INSULIN LISPRO 100 [IU]/ML
2-7 INJECTION, SOLUTION INTRAVENOUS; SUBCUTANEOUS
Status: DISCONTINUED | OUTPATIENT
Start: 2023-05-15 | End: 2023-05-19 | Stop reason: HOSPADM

## 2023-05-15 RX ORDER — LEVOTHYROXINE SODIUM 0.15 MG/1
150 TABLET ORAL
COMMUNITY
Start: 2023-04-20

## 2023-05-15 RX ORDER — NICOTINE POLACRILEX 4 MG
15 LOZENGE BUCCAL
Status: DISCONTINUED | OUTPATIENT
Start: 2023-05-15 | End: 2023-05-19 | Stop reason: HOSPADM

## 2023-05-15 RX ORDER — ATORVASTATIN CALCIUM 40 MG/1
40 TABLET, FILM COATED ORAL NIGHTLY
Status: DISCONTINUED | OUTPATIENT
Start: 2023-05-15 | End: 2023-05-19 | Stop reason: HOSPADM

## 2023-05-15 RX ORDER — SODIUM CHLORIDE 0.9 % (FLUSH) 0.9 %
10 SYRINGE (ML) INJECTION AS NEEDED
Status: DISCONTINUED | OUTPATIENT
Start: 2023-05-15 | End: 2023-05-19 | Stop reason: HOSPADM

## 2023-05-15 RX ORDER — SODIUM CHLORIDE 0.9 % (FLUSH) 0.9 %
10 SYRINGE (ML) INJECTION EVERY 12 HOURS SCHEDULED
Status: DISCONTINUED | OUTPATIENT
Start: 2023-05-15 | End: 2023-05-19 | Stop reason: HOSPADM

## 2023-05-15 RX ADMIN — SODIUM BICARBONATE 650 MG TABLET 1300 MG: at 21:16

## 2023-05-15 RX ADMIN — ASPIRIN 81 MG: 81 TABLET, COATED ORAL at 21:16

## 2023-05-15 RX ADMIN — CLOPIDOGREL BISULFATE 75 MG: 75 TABLET ORAL at 21:15

## 2023-05-15 RX ADMIN — HEPARIN SODIUM 5000 UNITS: 5000 INJECTION INTRAVENOUS; SUBCUTANEOUS at 21:16

## 2023-05-15 RX ADMIN — METOPROLOL TARTRATE 25 MG: 25 TABLET, FILM COATED ORAL at 21:16

## 2023-05-15 RX ADMIN — BUMETANIDE 2 MG: 1 TABLET ORAL at 21:16

## 2023-05-15 RX ADMIN — ATORVASTATIN CALCIUM 40 MG: 40 TABLET, FILM COATED ORAL at 21:15

## 2023-05-15 RX ADMIN — TAMSULOSIN HYDROCHLORIDE 0.4 MG: 0.4 CAPSULE ORAL at 21:16

## 2023-05-15 NOTE — ED NOTES
Left arm where fistula is has redness and hardness around it, pt reports it is very tender to touch. We had to use doppler to hear sounds and was unable to hear a bruit, you could hear a pulse to the left side of the fistula but nothing above or below.

## 2023-05-15 NOTE — Clinical Note
A 6 fr sheath was  inserted using micropuncture technique with ultrasound guidance into the right internal jugular vein.

## 2023-05-15 NOTE — H&P
[facility]  Date: 5/15/2023   Patient Name: Del Cueva  : 1940  MRN: [id number]  Primary Care Physician:  Provider, No Known  Date of admission: 5/15/2023    Subjective   Subjective     Chief Complaint:     HPI:    Del Cueva is a 82 y.o. male past medical history of end-stage renal disease on dialysis, COPD, coronary disease with MI and 3 stents placed, type 2 diabetes, hypothyroidism, and hypertension who came in after failing to get dialysis today    The patient has been having some issues with dialysis over the last couple of weeks.  Has been more difficult to access his fistula.  On Friday he only got about half a run per his wife.  Today he was unable to get accessed at all.  This was discussed with his primary nephrologist in [facility] that he come to the ER for evaluation of a fistula repair.  Patient had no fevers or chills.        In the emergency department the patient's vital signs are as follows: Temperature is 98.1, pulse 74, respiratory is 18, blood pressure 140/98 satting 96% on room air.  CBC shows hemoglobin 11.3 but no other abnormalities.  CMP shows a bicarb of 21.8 and a creatinine of 5.02.  Urinalysis is bland.  Vascular surgery was consulted and they will work on his fistula once he is admitted.  Nephrology was admitted in case the patient does need dialysis while in the hospital.  The patient be admitted to hospital for possible fistula revision.    All systems reviewed abnormal as noted above    Personal History     Past Medical History:  End-stage renal disease on dialysis  COPD  Coronary disease with MI and 3 stents placed  Type 2 diabetes  Hypothyroidism  Dyslipidemia  Hypertension    Past Surgical History:  AV fistula  Coronary angioplasty with stent placement   CABG over 30 years ago  Rotator cuff    Family History:   No family history of end-stage renal disease    Social History:   Former smoker.  No alcohol.    Home  Medications:  aspirin, atorvastatin, bumetanide, clopidogrel, finasteride, glipizide, levothyroxine, metoprolol tartrate, nateglinide, sodium bicarbonate, tamsulosin, and vitamin D    Allergies:  No Known Allergies      Objective   Objective     Vitals:   Temp:  [98.1 °F (36.7 °C)] 98.1 °F (36.7 °C)  Heart Rate:  [79-93] 93  Resp:  [16] 16  BP: (154-166)/() 154/103    Physical Exam    Constitutional: Awake, alert, no acute distress   Eyes: Pupils equal, sclerae anicteric, no conjunctival injection   HENT: NCAT, mucous membranes moist   Neck: Supple, no thyromegaly, no lymphadenopathy, trachea midline   Respiratory: Clear to auscultation bilaterally, nonlabored respirations    Cardiovascular: RRR, no murmurs, rubs, or gallops, palpable pedal pulses bilaterally   Gastrointestinal: Positive bowel sounds, soft, nontender, nondistended   Musculoskeletal: No bilateral ankle edema, no clubbing or cyanosis to extremities.    Psychiatric: Appropriate affect, cooperative   Neurologic: Oriented x 3, strength symmetric in all extremities, Cranial Nerves grossly intact to confrontation, speech clear   Skin: No rashes.  bruising on left arm    Result Review    Result Review:  I have personally reviewed the results from the time of this admission to 5/15/2023 14:59 EDT and agree with these findings:        Assessment & Plan   Assessment / Plan     Assessment/Plan:   End-stage renal disease on dialysis  Malfunctioning AV fistula  CAD  Hypothyroidism  Type 2 diabetes      Plan:  --Admit to hospitalist  --Consult nephrology  --Consult vascular surgery  --Patient is being admitted for malfunctioning AV fistula and inability get dialysis  --Continue Bumex 2 mg twice daily  --Continue metoprolol  -- Continue sodium bicarb  -- Continue aspirin and Plavix  -- Continue Synthroid  -- Sliding scale insulin      DVT prophylaxis:  Heparin every 12    CODE STATUS:     Full code    Admission Status:  I believe this patient meets  observation status.    Electronically signed by Maicol Becker MD, 05/15/23, 2:59 PM EDT.

## 2023-05-15 NOTE — SIGNIFICANT NOTE
05/15/23 1841   Living Environment   People in Home other (see comments)  (mini Mcclellan and his girlfriend, Briana)   Current Living Arrangements home   Primary Care Provided by other (see comments)  (mini and grandjay's girlfriend)   Provides Primary Care For no one   Caregiving Concerns none   Family Caregiver if Needed none   Quality of Family Relationships supportive   Able to Return to Prior Arrangements yes   Resource/Environmental Concerns   Resource/Environmental Concerns none   Transportation Concerns none   Transition Planning   Patient/Family Anticipates Transition to home   Patient/Family Anticipated Services at Transition none   Transportation Anticipated family or friend will provide   Discharge Needs Assessment   Readmission Within the Last 30 Days no previous admission in last 30 days   Equipment Currently Used at Home walker, kelly;wheelchair   Concerns to be Addressed no discharge needs identified   Anticipated Changes Related to Illness none   Equipment Needed After Discharge none     SW student appears bedside to pt who was alone and wearing a hearing air. Pt is coming from dialysis and was sent here due to a blocked fistula. Pt states that he lives at home with his grandson named Eleuterio and Eleuterio's girlfriend named Briana. Pt states that his grandson and the girlfriend are the support system. Pt states that he requires their support in everything that he does. Pt states that he does not have home health nor does he require it. Pt states that he is on dialysis and the facility is [facility] in [place], [place]. Pt states that he does not recall who his PCP is. Pt states that he uses the [facility] pharmacy in [place], [place]. Pt states that he has several items of medical equipment that he uses at home. Pt states that he does not need assistance paying for medication. Pt states that he does not drive, his caregivers drive for him. Pt states that he does not anticipate using any  other medical services, besides dialysis upon discharge. Pt states that he has not been admitted to a rehab/ hospital in the last 30 days.

## 2023-05-15 NOTE — CONSULTS
[facility]   Consult Note    Patient Name: Del Cueva  : 1940  MRN: [id number]  Primary Care Physician:  Provider, No Known  Referring Physician: No ref. provider found  Date of admission: 5/15/2023    Subjective   Subjective     Reason for Consult/ Chief Complaint: clotted AVF    HPI:  Del Cueva is a 82 y.o. male With past medical history significant for anemia, CHF, COPD, CAD, diabetes, hyperlipidemia, hypertension, history of MI,ESRD on hemodialysis.Presented to ER today due to clotted AV fistula.  He was instructed by his dialysis unit to go to ER.  He had partial treatment on Friday and was unable to get dialysis today.  Vascular was consulted in ER.  ER labs resulted creatinine 5.02, BUN 54, potassium 5.1.Hemoglobin 11.3.  Nephrology has been consulted to manage his hemodialysis.    Review of Systems  Constitutional:        Weakness tiredness fatigue  Eyes:                       No blurry vision, eye discharge, eye irritation, eye pain  HEENT:                   No acute hair loss, earache and discharge, nasal congestion or discharge, sore throat, postnasal drip  Respiratory:           No shortness of breath coughing sputum production wheezing hemoptysis pleuritic chest pain  Cardiovascular:     No chest pain, orthopnea, PND, dizziness, palpitation, lower extremity edema  Gastrointestinal:   No nausea vomiting diarrhea abdominal pain constipation  Genitourinary:       No urinary incontinence, hesitancy, frequency, urgency, dysuria  Neurological:        No confusion, headache, focal weakness, numbness, dysphasia  Hematologic:         No bruising, bleeding, pallor, lymphadenopathy  Endocrine:            No coldness, hot flashes, polyuria, abnormal hair growth  Musculoskeletal:  No body pains, aches, arthritic pains, muscle pain ,muscle wasting  Psychiatric:          No low or high mood, anxiety, hallucinations, delusions  Skin.                      No rash, ulcers, bruising,  itching    Personal History     Past Medical History:   Diagnosis Date   • Anemia    • Arthritis    • CHF (congestive heart failure)    • Chronic renal disease, stage 4, severely decreased glomerular filtration rate (GFR) between 15-29 mL/min/1.73 square meter    • COPD (chronic obstructive pulmonary disease)     NO INHALERS OR NEBULIZER   • Coronary artery disease    • Diabetes mellitus     BS NOT CHECKED DAILY   • Disease of thyroid gland    • Hyperlipidemia    • Hypertension    • MI (myocardial infarction)     3 STENT PLACED JAN 2021 THROUGH [facility]. FOLLOWED BY DR BETHEA   • SOB (shortness of breath)     REPORTS CHRONIC ISSUE HX OF COPD       Past Surgical History:   Procedure Laterality Date   • ARTERIOVENOUS FISTULA/SHUNT SURGERY Left 3/30/2022    Procedure: LEFT UPPER ARM BASILIC VEIN TRANSPOSITION;  Surgeon: Rigo Palmer MD;  Location: [facility];  Service: Vascular;  Laterality: Left;   • CORONARY ANGIOPLASTY WITH STENT PLACEMENT  01/15/2021   • CORONARY ARTERY BYPASS GRAFT      DAUGHTER REPORTS OVER 30 YEARS AGO   • SHOULDER ROTATOR CUFF REPAIR Left        Family History: family history is not on file. Otherwise pertinent FHx was reviewed and not pertinent to current issue.    Social History:  reports that he quit smoking about 36 years ago. His smoking use included cigarettes. His smokeless tobacco use includes chew. He reports that he does not drink alcohol and does not use drugs.    Home Medications:  aspirin, atorvastatin, bumetanide, clopidogrel, ferrous sulfate, glipizide, levothyroxine, metoprolol tartrate, nateglinide, sodium bicarbonate, and tamsulosin    Allergies:  No Known Allergies    Objective    Objective     Vitals:   Temp:  [97.3 °F (36.3 °C)-98.1 °F (36.7 °C)] 97.7 °F (36.5 °C)  Heart Rate:  [74-93] 79  Resp:  [16-18] 16  BP: (120-182)/() 171/72    Physical Exam:             Constitutional:         Awake, alert responsive, conversant, no obvious distress   Eyes:                        PERRLA, sclerae anicteric, no conjunctival injection   HEENT:                   Moist mucous membranes, no nasal or eye discharge, no throat congestion   Neck:                      Supple, no thyromegaly, no lymphadenopathy, trachea midline, no elevated JVD   Respiratory:           Clear to auscultation bilaterally, nonlabored respirations    Cardiovascular:     RRR, no murmurs, rubs, or gallops, palpable pedal pulses bilaterally, No bilateral ankle edema.  Tenderness and erythema to lt upper arm AVF, No bruit, no thrill.    Gastrointestinal:   Positive bowel sounds, soft, nontender, non-distended, no organomegaly   Musculoskeletal:  No clubbing or cyanosis to extremities, muscle wasting, joint swelling, muscle weakness   Psychiatric:              Appropriate affect, cooperative   Neurologic:            Awake alert, oriented x 3, strength symmetric in all extremities, Cranial Nerves grossly intact to confrontation, speech clear   Skin:                      No rashes, bruising, skin ulcers, petechiae or ecchymosis    Result Review    Result Review:  I have personally reviewed the results from the time of this admission to 5/16/2023 12:38 EDT and agree with these findings:  []  Laboratory  []  Microbiology  []  Radiology  []  EKG/Telemetry   []  Cardiology/Vascular   []  Pathology  []  Old records  []  Other:      Assessment & Plan   Assessment / Plan     Active Hospital Problems:  Active Hospital Problems    Diagnosis    • **Malfunction of arteriovenous dialysis fistula, initial encounter    • Hyperkalemia    • Deafness    • History of anemia due to chronic kidney disease    • End stage renal disease on dialysis        Plan:   No urgent need for dialysis today  Vascular consult completed in ER,Patient need declotting of the access  CMP in a.m.    Electronically signed by Cee Mcmahon MD, 05/15/23, 2:56 PM EDT.

## 2023-05-15 NOTE — ED NOTES
3 RN's attempted IV access to Right arm one using ultrasound Unable to get IV but was able to get blood, can only use right arm for sticks. Dr. Treviño was made aware.

## 2023-05-15 NOTE — ED PROVIDER NOTES
"Time: 1:40 PM EDT  Date of encounter:  5/15/2023  Independent Historian/Clinical History and Information was obtained by:   Patient  Chief Complaint   Patient presents with   • Vascular Access Problem     Pt arrived to the ED from dialysis center reporting a problem with his fistula. Pt last received dialysis 5/12 and was only able to do half. Pt was unable to have dialysis today \"due to his fistula not working.\"       History is limited by: N/A    History of Present Illness:  Patient is a 82 y.o. year old male who presents to the emergency department for evaluation of fistula problem.  Patient states he was only able to get half of his dialysis on Monday but today they were unable to access his fistula at all and sent to the emergency department.  (Provider in triage, Aaron Briceño PA-C)    [facility]    Patient Care Team  Primary Care Provider: Provider, No Known    Past Medical History:     No Known Allergies  Past Medical History:   Diagnosis Date   • Anemia    • Arthritis    • CHF (congestive heart failure)    • Chronic renal disease, stage 4, severely decreased glomerular filtration rate (GFR) between 15-29 mL/min/1.73 square meter    • COPD (chronic obstructive pulmonary disease)     NO INHALERS OR NEBULIZER   • Coronary artery disease    • Diabetes mellitus     BS NOT CHECKED DAILY   • Disease of thyroid gland    • Hyperlipidemia    • Hypertension    • MI (myocardial infarction)     3 STENT PLACED JAN 2021 THROUGH [facility]. FOLLOWED BY DR BETHEA   • SOB (shortness of breath)     REPORTS CHRONIC ISSUE HX OF COPD     Past Surgical History:   Procedure Laterality Date   • ARTERIOVENOUS FISTULA/SHUNT SURGERY Left 3/30/2022    Procedure: LEFT UPPER ARM BASILIC VEIN TRANSPOSITION;  Surgeon: Rigo Palmer MD;  Location: [facility];  Service: Vascular;  Laterality: Left;   • CORONARY ANGIOPLASTY WITH STENT PLACEMENT  01/15/2021   • CORONARY ARTERY BYPASS GRAFT      DAUGHTER REPORTS OVER 30 YEARS AGO   • SHOULDER " ROTATOR CUFF REPAIR Left      History reviewed. No pertinent family history.    Home Medications:  Prior to Admission medications    Medication Sig Start Date End Date Taking? Authorizing Provider   aspirin 81 MG EC tablet Take 1 tablet by mouth Daily. PER DR ALTAMIRANO/JARRED AT [facility] TO CONTINUE TAKING UP TO DAY OF SURGERY. MESSAGE LEFT FOR JOHNNA NUNEZ    Shanika Benavides MD   atorvastatin (LIPITOR) 40 MG tablet Take 1 tablet by mouth Daily. 6/10/21   Shanika Benavides MD   bumetanide (BUMEX) 2 MG tablet Take 1 tablet by mouth 2 (Two) Times a Day. 7/6/21   Shanika Benavides MD   clopidogrel (PLAVIX) 75 MG tablet Take 1 tablet by mouth Daily. PER DR OWENS AT [facility] TO CONTINUE TAKING UP TO DAY OF SURGERY. MESSAGE LEFT FOR JOHNNA NUNEZ 7/6/21   Shanika Benavides MD   finasteride (PROSCAR) 5 MG tablet Take 1 tablet by mouth Daily. 7/13/21   Shanika Benavides MD   glipizide (GLUCOTROL) 5 MG tablet Take 1 tablet by mouth Daily. INSTRUCTED PER ANESTHESIA PROTOCOL 7/28/21   Shanika Benavides MD   levothyroxine (SYNTHROID, LEVOTHROID) 137 MCG tablet Take 1 tablet by mouth Every Night. 7/12/21   Shanika Benavides MD   metoprolol tartrate (LOPRESSOR) 25 MG tablet Take 1 tablet by mouth 2 (Two) Times a Day. 7/29/21   Shanika Benavides MD   nateglinide (STARLIX) 60 MG tablet Take 1 tablet by mouth 3 (Three) Times a Day. INSTRUCTED PER ANESTHESIA PROTOCOL 8/6/21   Shanika Benavides MD   sodium bicarbonate 650 MG tablet Take 2 tablets by mouth 3 (Three) Times a Day. 6/10/21   Shanika Benavides MD   tamsulosin (FLOMAX) 0.4 MG capsule 24 hr capsule Take 1 capsule by mouth Every Night. 6/10/21   Shanika Benavides MD   vitamin D (ERGOCALCIFEROL) 1.25 MG (19773 UT) capsule capsule Take 1 capsule by mouth 1 (One) Time Per Week. 7/16/21   Shanika Benavides MD        Social History:   Social History     Tobacco Use   • Smoking status: Former     Types:  "Cigarettes     Quit date: 1987     Years since quittin.3   • Smokeless tobacco: Current     Types: Chew   • Tobacco comments:     INSTRUCTED NONE 24 HOURS PRIOR TO PROCEDURE   Vaping Use   • Vaping Use: Never used   Substance Use Topics   • Alcohol use: Never   • Drug use: Never         Review of Systems:  Review of Systems     Physical Exam:  BP (!) 154/103   Pulse 93   Temp 98.1 °F (36.7 °C) (Oral)   Resp 16   Ht 175.3 cm (69\")   Wt 91.2 kg (201 lb 1 oz)   SpO2 93%   BMI 29.69 kg/m²     Physical Exam             Procedures:  Procedures      Medical Decision Making:      Comorbidities that affect care:    Diabetes    External Notes reviewed:    Previous Clinic Note: Vascular office visit      The following orders were placed and all results were independently analyzed by me:  Orders Placed This Encounter   Procedures   • CBC Auto Differential   • Basic Metabolic Panel   • Urinalysis With Culture If Indicated - Urine, Clean Catch   • IP General Consult (Use specialty-specific consult if known)   • IP General Consult (Use specialty-specific consult if known)   • Hospitalist (on-call MD unless specified)   • CBC & Differential       Medications Given in the Emergency Department:  Medications - No data to display     ED Course:    The patient was initially evaluated in the triage area where orders were placed. The patient was later dispositioned by Nawaf Treviño MD.      The patient was advised to stay for completion of workup which includes but is not limited to communication of labs and radiological results, reassessment and plan. The patient was advised that leaving prior to disposition by a provider could result in critical findings that are not communicated to the patient.     ED Course as of 05/15/23 1613   Mon May 15, 2023   1343 PROVIDER IN TRIAGE  Patient was evaluated by me in triage, Aaron Briceño PA-C.  Orders were placed and patient is currently awaiting final results and disposition.  " [MD]   1608 EKG: Rate 79, left atrial enlargement, borderline IVCD, normal ST segment, normal QT interval, change from March 16, 2022. [RW]      ED Course User Index  [MD] Aaron Briceño PA-C  [RW] Nawaf Treviño MD       Labs:    Lab Results (last 24 hours)     ** No results found for the last 24 hours. **           Imaging:    No Radiology Exams Resulted Within Past 24 Hours      Differential Diagnosis and Discussion:              MDM  Number of Diagnoses or Management Options  Malfunction of arteriovenous dialysis fistula, initial encounter  Diagnosis management comments: Patient presents with apparent malfunction of his left arm dialysis fistula.  He was unable be dialyzed today.  Following his evaluation here today vascular surgical consultation was obtained the patient is admitted to medicine with nephrology consultation also having been obtained.  He is stable.  We will check his electrolytes.  Vascular surgery following admission will provide definitive management.  Patient is stable on final assessment.           Patient Care Considerations:          Consultants/Shared Management Plan:        Social Determinants of Health:    Patient is independent, reliable, and has access to care.       Disposition and Care Coordination:    Admit:   Through independent evaluation of the patient's history, physical, and imperical data, the patient meets criteria for observation/admission to the hospital.        Final diagnoses:   Malfunction of arteriovenous dialysis fistula, initial encounter        ED Disposition     ED Disposition   Decision to Admit    Condition   --    Comment   --             This medical record created using voice recognition software.           Nawaf Treviño MD  05/15/23 9040       Nawaf Treviño MD  05/15/23 0171     Transition Complete   • Discontinue Glucommander After Transition Complete   • Obtain Informed Consent   • Per Dr. Mcmahon, hold all A.M. meds as pt. Must take in applesauce.  Nursing Communication   • Ambulate patient and get up to chair  Nursing Communication   • Discharge Follow-up with Specified Provider: Follow up in dialysis clinic this Monday.   • Leave Dressing On - Keep Clean, Dry & Intact Until Clinic Visit   • Discharge Follow-up with Specified Provider: Dr. Mcmahon nephrology; 2 Weeks   • Inpatient Vascular Surgery Consult   • POC Glucose Once   • POC Glucose Once   • POC Glucose Once   • POC Glucose Once   • POC Glucose Once   • POC Glucose Once   • POC Glucose Once   • POC Glucose Once   • POC Glucose Once   • ECG 12 Lead Electrolyte Imbalance   • SCANNED - TELEMETRY     • SCANNED - TELEMETRY     • SCANNED - TELEMETRY     • SCANNED - TELEMETRY     • SCANNED - TELEMETRY     • SCANNED - TELEMETRY     • SCANNED - TELEMETRY     • SCANNED - TELEMETRY     • Type & Screen   • ABO RH Specimen Verification   • Hemodialysis Inpatient   • Initiate Observation Status   • Inpatient Admission   • Discharge patient   • CBC & Differential   • Extra Tubes   • Light Blue Top   • CBC & Differential   • CBC & Differential   • CBC & Differential       Medications Given in the Emergency Department:  Medications   sodium zirconium cyclosilicate (LOKELMA) pack 10 g (10 g Oral Given 5/16/23 2122)   sodium zirconium cyclosilicate (LOKELMA) pack 10 g (10 g Oral Given 5/17/23 2108)   ceFAZolin (ANCEF) IVPB (2 g Intravenous New Bag 5/18/23 1348)        ED Course:    The patient was initially evaluated in the triage area where orders were placed. The patient was later dispositioned by Nawaf Treviño MD.      The patient was advised to stay for completion of workup which includes but is not limited to communication of labs and radiological results, reassessment and plan. The patient was advised that leaving prior to disposition by a  provider could result in critical findings that are not communicated to the patient.     ED Course as of 06/02/23 1548   Mon May 15, 2023   1343 PROVIDER IN TRIAGE  Patient was evaluated by me in triage, Aaron Briceño PA-C.  Orders were placed and patient is currently awaiting final results and disposition.  [MD]   1608 EKG: Rate 79, left atrial enlargement, borderline IVCD, normal ST segment, normal QT interval, change from March 16, 2022. [RW]      ED Course User Index  [MD] Aaron Briceño PA-C  [RW] Nawaf Treviño MD       Labs:    Lab Results (last 24 hours)     ** No results found for the last 24 hours. **           Imaging:    No Radiology Exams Resulted Within Past 24 Hours      Differential Diagnosis and Discussion:              MDM  Number of Diagnoses or Management Options  Malfunction of arteriovenous dialysis fistula, initial encounter  Diagnosis management comments: Patient presents with apparent malfunction of his left arm dialysis fistula.  He was unable be dialyzed today.  Following his evaluation here today vascular surgical consultation was obtained the patient is admitted to medicine with nephrology consultation also having been obtained.  He is stable.  We will check his electrolytes.  Vascular surgery following admission will provide definitive management.  Patient is stable on final assessment.           Patient Care Considerations:          Consultants/Shared Management Plan:        Social Determinants of Health:    Patient is independent, reliable, and has access to care.       Disposition and Care Coordination:    Admit:   Through independent evaluation of the patient's history, physical, and imperical data, the patient meets criteria for observation/admission to the hospital.        Final diagnoses:   Malfunction of arteriovenous dialysis fistula, initial encounter        ED Disposition     ED Disposition   Decision to Admit    Condition   --    Comment   Level of Care: Telemetry [5]    Diagnosis: Malfunction of arteriovenous dialysis fistula, initial encounter [[id number]]               This medical record created using voice recognition software.           Nawaf Treviño MD  05/15/23 1456       Nawaf Treviño MD  05/15/23 5365       Nawaf Treviño MD  06/02/23 2631     individual instruction

## 2023-05-15 NOTE — Clinical Note
Prepped with: ChloraPrep. The site was clipped. The patient was draped in a sterile fashion. Left fistula

## 2023-05-16 ENCOUNTER — APPOINTMENT (OUTPATIENT)
Dept: CARDIOLOGY | Facility: HOSPITAL | Age: 83
DRG: 314 | End: 2023-05-16
Payer: MEDICARE

## 2023-05-16 PROBLEM — Z86.2 HISTORY OF ANEMIA DUE TO CHRONIC KIDNEY DISEASE: Status: ACTIVE | Noted: 2023-05-16

## 2023-05-16 PROBLEM — E87.5 HYPERKALEMIA: Status: ACTIVE | Noted: 2023-05-16

## 2023-05-16 PROBLEM — H91.90 DEAFNESS: Status: ACTIVE | Noted: 2023-05-16

## 2023-05-16 PROBLEM — N18.9 HISTORY OF ANEMIA DUE TO CHRONIC KIDNEY DISEASE: Status: ACTIVE | Noted: 2023-05-16

## 2023-05-16 LAB
ALBUMIN SERPL-MCNC: 3.7 G/DL (ref 3.5–5.2)
ALBUMIN/GLOB SERPL: 1.2 G/DL
ALP SERPL-CCNC: 121 U/L (ref 39–117)
ALT SERPL W P-5'-P-CCNC: 12 U/L (ref 1–41)
ANION GAP SERPL CALCULATED.3IONS-SCNC: 11.9 MMOL/L (ref 5–15)
AST SERPL-CCNC: 14 U/L (ref 1–40)
BASOPHILS # BLD AUTO: 0.07 10*3/MM3 (ref 0–0.2)
BASOPHILS NFR BLD AUTO: 1.1 % (ref 0–1.5)
BH CV VAS DIALYSIS ARTERIAL ANASTOMOSIS DIAMETER: 0.36 CM
BH CV VAS DIALYSIS ARTERIAL ANASTOMOSIS PSV: 60 CM/SEC
BH CV VAS DIALYSIS CONDUIT DIST DEPTH: 0.63 CM
BH CV VAS DIALYSIS CONDUIT DIST DIAMETER: 0.91 CM
BH CV VAS DIALYSIS CONDUIT DIST EDV: 0 CM/SEC
BH CV VAS DIALYSIS CONDUIT DIST PSV: 0 CM/SEC
BH CV VAS DIALYSIS CONDUIT MID DIAMETER: 0.58 CM
BH CV VAS DIALYSIS CONDUIT MID EDV: 0 CM/SEC
BH CV VAS DIALYSIS CONDUIT MID PSV: 0 CM/SEC
BH CV VAS DIALYSIS CONDUIT MID/DIST EDV: 0 CM/SEC
BH CV VAS DIALYSIS CONDUIT MID/DIST PSV: 0 CM/SEC
BH CV VAS DIALYSIS CONDUIT PROX DEPTH: 0.71 CM
BH CV VAS DIALYSIS CONDUIT PROX DIAMETER: 0.34 CM
BH CV VAS DIALYSIS CONDUIT PROX EDV: 0 CM/SEC
BH CV VAS DIALYSIS CONDUIT PROX PSV: 40 CM/SEC
BH CV VAS DIALYSIS CONDUIT PROX/MID EDV: 0 CM/SEC
BH CV VAS DIALYSIS CONDUIT PROX/MID PSV: 44 CM/SEC
BH CV VAS DIALYSIS PRE-INFLOW BRACHIAL DIAMETER: 0.59 CM
BH CV VAS DIALYSIS PRE-INFLOW BRACHIAL EDV: 0 CM/SEC
BH CV VAS DIALYSIS PRE-INFLOW BRACHIAL FLOW VOL: 156 ML/MIN
BH CV VAS DIALYSIS PRE-INFLOW BRACHIAL PSV: 56 CM/SEC
BH CV VAS DIALYSIS VENOUS OUTFLOW AXILLARY DIAMETER: 0.51 CM
BILIRUB SERPL-MCNC: 0.3 MG/DL (ref 0–1.2)
BUN SERPL-MCNC: 53 MG/DL (ref 8–23)
BUN/CREAT SERPL: 10.6 (ref 7–25)
CALCIUM SPEC-SCNC: 9.2 MG/DL (ref 8.6–10.5)
CHLORIDE SERPL-SCNC: 107 MMOL/L (ref 98–107)
CO2 SERPL-SCNC: 18.1 MMOL/L (ref 22–29)
CREAT SERPL-MCNC: 5.02 MG/DL (ref 0.76–1.27)
DEPRECATED RDW RBC AUTO: 55.9 FL (ref 37–54)
EGFRCR SERPLBLD CKD-EPI 2021: 10.9 ML/MIN/1.73
EOSINOPHIL # BLD AUTO: 0.28 10*3/MM3 (ref 0–0.4)
EOSINOPHIL NFR BLD AUTO: 4.2 % (ref 0.3–6.2)
ERYTHROCYTE [DISTWIDTH] IN BLOOD BY AUTOMATED COUNT: 16.4 % (ref 12.3–15.4)
GLOBULIN UR ELPH-MCNC: 3.1 GM/DL
GLUCOSE BLDC GLUCOMTR-MCNC: 134 MG/DL (ref 70–99)
GLUCOSE BLDC GLUCOMTR-MCNC: 142 MG/DL (ref 70–99)
GLUCOSE BLDC GLUCOMTR-MCNC: 159 MG/DL (ref 70–99)
GLUCOSE SERPL-MCNC: 153 MG/DL (ref 65–99)
HCT VFR BLD AUTO: 35.8 % (ref 37.5–51)
HGB BLD-MCNC: 11.8 G/DL (ref 13–17.7)
IMM GRANULOCYTES # BLD AUTO: 0.05 10*3/MM3 (ref 0–0.05)
IMM GRANULOCYTES NFR BLD AUTO: 0.8 % (ref 0–0.5)
LYMPHOCYTES # BLD AUTO: 0.73 10*3/MM3 (ref 0.7–3.1)
LYMPHOCYTES NFR BLD AUTO: 11 % (ref 19.6–45.3)
MAGNESIUM SERPL-MCNC: 1.6 MG/DL (ref 1.6–2.4)
MAXIMAL PREDICTED HEART RATE: 139 BPM
MCH RBC QN AUTO: 32.2 PG (ref 26.6–33)
MCHC RBC AUTO-ENTMCNC: 33 G/DL (ref 31.5–35.7)
MCV RBC AUTO: 97.8 FL (ref 79–97)
MONOCYTES # BLD AUTO: 0.59 10*3/MM3 (ref 0.1–0.9)
MONOCYTES NFR BLD AUTO: 8.9 % (ref 5–12)
NEUTROPHILS NFR BLD AUTO: 4.89 10*3/MM3 (ref 1.7–7)
NEUTROPHILS NFR BLD AUTO: 74 % (ref 42.7–76)
NRBC BLD AUTO-RTO: 0 /100 WBC (ref 0–0.2)
PLATELET # BLD AUTO: 200 10*3/MM3 (ref 140–450)
PMV BLD AUTO: 10.5 FL (ref 6–12)
POTASSIUM SERPL-SCNC: 5.4 MMOL/L (ref 3.5–5.2)
PROT SERPL-MCNC: 6.8 G/DL (ref 6–8.5)
RBC # BLD AUTO: 3.66 10*6/MM3 (ref 4.14–5.8)
SODIUM SERPL-SCNC: 137 MMOL/L (ref 136–145)
STRESS TARGET HR: 118 BPM
WBC NRBC COR # BLD: 6.61 10*3/MM3 (ref 3.4–10.8)

## 2023-05-16 PROCEDURE — 86900 BLOOD TYPING SEROLOGIC ABO: CPT

## 2023-05-16 PROCEDURE — 25010000002 HEPARIN (PORCINE) PER 1000 UNITS: Performed by: INTERNAL MEDICINE

## 2023-05-16 PROCEDURE — 83735 ASSAY OF MAGNESIUM: CPT | Performed by: INTERNAL MEDICINE

## 2023-05-16 PROCEDURE — 85025 COMPLETE CBC W/AUTO DIFF WBC: CPT | Performed by: INTERNAL MEDICINE

## 2023-05-16 PROCEDURE — 93990 DOPPLER FLOW TESTING: CPT

## 2023-05-16 PROCEDURE — 86901 BLOOD TYPING SEROLOGIC RH(D): CPT

## 2023-05-16 PROCEDURE — 99233 SBSQ HOSP IP/OBS HIGH 50: CPT | Performed by: INTERNAL MEDICINE

## 2023-05-16 PROCEDURE — 80053 COMPREHEN METABOLIC PANEL: CPT | Performed by: INTERNAL MEDICINE

## 2023-05-16 PROCEDURE — 93990 DOPPLER FLOW TESTING: CPT | Performed by: SURGERY

## 2023-05-16 PROCEDURE — G0378 HOSPITAL OBSERVATION PER HR: HCPCS

## 2023-05-16 PROCEDURE — 82948 REAGENT STRIP/BLOOD GLUCOSE: CPT

## 2023-05-16 PROCEDURE — 63710000001 INSULIN LISPRO (HUMAN) PER 5 UNITS: Performed by: INTERNAL MEDICINE

## 2023-05-16 RX ADMIN — HEPARIN SODIUM 5000 UNITS: 5000 INJECTION INTRAVENOUS; SUBCUTANEOUS at 21:22

## 2023-05-16 RX ADMIN — ASPIRIN 81 MG: 81 TABLET, COATED ORAL at 09:04

## 2023-05-16 RX ADMIN — SODIUM ZIRCONIUM CYCLOSILICATE 10 G: 10 POWDER, FOR SUSPENSION ORAL at 13:23

## 2023-05-16 RX ADMIN — SODIUM BICARBONATE 650 MG TABLET 1300 MG: at 16:34

## 2023-05-16 RX ADMIN — METOPROLOL TARTRATE 25 MG: 25 TABLET, FILM COATED ORAL at 21:22

## 2023-05-16 RX ADMIN — LEVOTHYROXINE SODIUM 150 MCG: 150 TABLET ORAL at 09:04

## 2023-05-16 RX ADMIN — CLOPIDOGREL BISULFATE 75 MG: 75 TABLET ORAL at 09:04

## 2023-05-16 RX ADMIN — ATORVASTATIN CALCIUM 40 MG: 40 TABLET, FILM COATED ORAL at 21:22

## 2023-05-16 RX ADMIN — SODIUM BICARBONATE 650 MG TABLET 1300 MG: at 21:22

## 2023-05-16 RX ADMIN — Medication 10 ML: at 09:04

## 2023-05-16 RX ADMIN — BUMETANIDE 2 MG: 1 TABLET ORAL at 21:22

## 2023-05-16 RX ADMIN — SODIUM BICARBONATE 650 MG TABLET 1300 MG: at 09:04

## 2023-05-16 RX ADMIN — Medication 10 ML: at 21:23

## 2023-05-16 RX ADMIN — INSULIN LISPRO 2 UNITS: 100 INJECTION, SOLUTION INTRAVENOUS; SUBCUTANEOUS at 12:08

## 2023-05-16 RX ADMIN — TAMSULOSIN HYDROCHLORIDE 0.4 MG: 0.4 CAPSULE ORAL at 21:22

## 2023-05-16 RX ADMIN — HEPARIN SODIUM 5000 UNITS: 5000 INJECTION INTRAVENOUS; SUBCUTANEOUS at 09:03

## 2023-05-16 RX ADMIN — SODIUM ZIRCONIUM CYCLOSILICATE 10 G: 10 POWDER, FOR SUSPENSION ORAL at 21:22

## 2023-05-16 RX ADMIN — METOPROLOL TARTRATE 25 MG: 25 TABLET, FILM COATED ORAL at 09:04

## 2023-05-16 RX ADMIN — BUMETANIDE 2 MG: 1 TABLET ORAL at 09:04

## 2023-05-16 NOTE — PROGRESS NOTES
[facility]     Progress Note    Patient Name: Del Cueva  : 1940  MRN: [id number]  Primary Care Physician:  Provider, No Known  Date of admission: 5/15/2023    Subjective  Patient is doing fine and has no new issues  Potassium 5.3  He is very hard of hearing    Review of Systems  Constitutional:        Weakness tiredness fatigue  Eyes:                       No blurry vision, eye discharge, eye irritation, eye pain  HEENT:                   No acute hair loss, earache and discharge, nasal congestion or discharge, sore throat, postnasal drip  Respiratory:           No shortness of breath coughing sputum production wheezing hemoptysis pleuritic chest pain  Cardiovascular:     No chest pain, orthopnea, PND, dizziness, palpitation, lower extremity edema  Gastrointestinal:   No nausea vomiting diarrhea abdominal pain constipation  Genitourinary:       No urinary incontinence, hesitancy, frequency, urgency, dysuria  Hematologic:         No bruising, bleeding, pallor, lymphadenopathy  Endocrine:            No coldness, hot flashes, polyuria, abnormal hair growth  Musculoskeletal:    No body pains, aches, arthritic pains, muscle pain ,muscle wasting  Psychiatric:          No low or high mood, anxiety, hallucinations, delusions  Skin.                      No rash, ulcers, bruising, itching  Neurological:        No confusion, headache, focal weakness, numbness, dysphasia    Objective   Objective     Vitals:   Temp:  [97.3 °F (36.3 °C)-98.1 °F (36.7 °C)] 97.7 °F (36.5 °C)  Heart Rate:  [74-93] 79  Resp:  [16-18] 16  BP: (120-182)/() 171/72  Physical Exam    Constitutional: Awake, alert responsive, conversant, no obvious distress              Psychiatric:  Appropriate affect, cooperative   Neurologic:  Awake alert ,oriented x 3, strength symmetric in all extremities, Cranial Nerves grossly intact to confrontation, speech clear   Eyes:   PERRLA, sclerae anicteric, no conjunctival injection   HEENT:  Moist  mucous membranes, no nasal or eye discharge, no throat congestion   Neck:   Supple, no thyromegaly, no lymphadenopathy, trachea midline, no elevated JVD   Respiratory:  Clear to auscultation bilaterally, nonlabored respirations    Cardiovascular: RRR, no murmurs, rubs, or gallops, palpable pedal pulses bilaterally, No bilateral ankle edema   Gastrointestinal: Positive bowel sounds, soft, nontender, nondistended, no organomegaly   Musculoskeletal:  No clubbing or cyanosis to extremities,muscle wasting, joint swelling, muscle weakness             Skin:                      No rashes, bruising, skin ulcers, petechiae or ecchymosis    Result Review    Result Review:  I have personally reviewed the results from the time of this admission to 5/16/2023 12:39 EDT and agree with these findings:  []  Laboratory  []  Microbiology  []  Radiology  []  EKG/Telemetry   []  Cardiology/Vascular   []  Pathology  []  Old records  []  Other:    Assessment & Plan   Assessment / Plan       Active Hospital Problems:    Active Hospital Problems    Diagnosis  POA   • **Malfunction of arteriovenous dialysis fistula, initial encounter [T82.590A]  Yes   • Hyperkalemia [E87.5]  Unknown   • Deafness [H91.90]  Unknown   • History of anemia due to chronic kidney disease [N18.9, Z86.2]  Not Applicable   • End stage renal disease on dialysis [N18.6, Z99.2]  Not Applicable     Added automatically from request for surgery [id number]         Plan:   Waiting for vascular surgery for thrombectomy so we can dialyze the patient.  I will give p.cecelia Jc       Electronically signed by Cee Mcmahon MD, 05/16/23, 12:39 PM EDT.

## 2023-05-16 NOTE — PLAN OF CARE
Goal Outcome Evaluation:              Outcome Evaluation: Pt is schedule for surgerywith vascular on Wednesday. No pain. Consent signed. No acute change . Continue monitor.

## 2023-05-16 NOTE — PROGRESS NOTES
[facility]   Hospitalist Progress Note  Date: 2023  Patient Name: Del Cueva  : 1940  MRN: [id number]  Date of admission: 5/15/2023    Subjective   Subjective     Chief Complaint:   Follow-up malfunctioning AV fistula    Summary:   Del Cueva is a 82 y.o. male past medical history of end-stage renal disease on dialysis, COPD, coronary disease with MI and 3 stents placed, type 2 diabetes, hypothyroidism, and hypertension who came in after failing to get dialysis secondary to a malfunctioning AV fistula    Interval Followup:   No acute events overnight, patient resting comfortably in bed, patient tolerating breakfast    Objective   Objective     Vitals:   Temp:  [97.3 °F (36.3 °C)-98.1 °F (36.7 °C)] 97.3 °F (36.3 °C)  Heart Rate:  [74-93] 79  Resp:  [16-18] 16  BP: (120-182)/() 169/71    Physical Exam   GEN: No acute distress  HEENT: Moist mucous membranes  LUNGS: Equal chest rise bilaterally  NEURO: Moving all 4 extremities spontaneously  SKIN: No obvious breakdown    Result Review    Result Review:  I have personally reviewed the results as below  [x]  Laboratory  CBC        2022    04:28 5/15/2023    15:43 2023    05:06   CBC   WBC 12.06      7.20   6.61     RBC 2.90      3.51   3.66     Hemoglobin 8.5      11.3   11.8     Hematocrit 25.8      33.9   35.8     MCV 89.0      96.6   97.8     MCH 29.3      32.2   32.2     MCHC 32.9      33.3   33.0     RDW 15.7      16.4   16.4     Platelets 205      194   200         This result is from an external source.     CMP        5/15/2023    15:43 2023    05:06   CMP   Glucose 111   153     BUN 54   53     Creatinine 5.02   5.02     EGFR 10.9   10.9     Sodium 137   137     Potassium 5.1   5.4     Chloride 103   107     Calcium 9.0   9.2     Total Protein  6.8     Albumin  3.7     Globulin  3.1     Total Bilirubin  0.3     Alkaline Phosphatase  121     AST (SGOT)  14     ALT (SGPT)  12     Albumin/Globulin Ratio  1.2      BUN/Creatinine Ratio 10.8   10.6     Anion Gap 12.2   11.9       []  Microbiology  []  Radiology  []  EKG/Telemetry   []  Cardiology/Vascular   []  Pathology  []  Old records  []  Other:    Assessment & Plan   Assessment / Plan     Assessment:  ESRD on HD  Malfunctioning AV fistula  Coronary artery disease  Hypothyroidism  Type 2 diabetes mellitus    Plan:  • Patient mated to the hospital for further work-up and management of above  • Nephrology consulted, appreciate their recommendations  • Vascular surgery consult for malfunctioning AV fistula, planning on intervention on Wednesday  • Continue Bumex 2 mg twice daily  • Continue metoprolol  • Continue sodium bicarb  • Continue aspirin Plavix for now  • Continue Synthroid  • Start sliding scale insulin, monitor glucose, titrate as needed  • CBC, CMP personally reviewed  • Discuss management plan with vascular surgery  • Repeat CBC, CMP, mag and Phos in a.m.    Reviewed patients labs and imaging, and discussed with patient and nurse at bedside.    DVT prophylaxis:  Medical DVT prophylaxis orders are present.    CODE STATUS:   Level Of Support Discussed With: Patient  Code Status (Patient has no pulse and is not breathing): CPR (Attempt to Resuscitate)  Medical Interventions (Patient has pulse or is breathing): Full Support        Electronically signed by Hernandez Hylton MD, 05/16/23, 8:52 AM EDT.

## 2023-05-16 NOTE — PLAN OF CARE
Goal Outcome Evaluation:  Plan of Care Reviewed With: patient   No acute events this shift.  VSS.     Progress: improving

## 2023-05-16 NOTE — PROGRESS NOTES
[facility]     Progress Note    Patient Name: Del Cueva  : 1940  MRN: [id number]  Primary Care Physician:  Provider, No Known  Date of admission: 5/15/2023    Subjective   Subjective     Pt seen and resting in bed.  Questions answered regarding procedure tomorrow.    Objective   Objective     Vitals:   Temp:  [97.3 °F (36.3 °C)-98.1 °F (36.7 °C)] 97.3 °F (36.3 °C)  Heart Rate:  [75-82] 82  Resp:  [16-18] 18  BP: (142-182)/(71-85) 174/83    Physical Exam   Constitutional: Awake, alert   Neck: Supple   Respiratory: Clear to auscultation bilaterally, nonlabored respirations    Cardiovascular: RRR, no murmurs   Abdomen: benign   Extremities: LUE AV fistula with no thrill or bruit, some bruising   Pulses: B radial pulses palp.        Assessment & Plan   Assessment / Plan     Assessment/Plan:    82-year-old  male with recent left brachiobasilic AV fistula dysfunction now presenting with evidence of thrombosis.  Last full dialysis on last Wednesday.  2.  Discussed with patient the need for attempt at left brachiobasilic AV fistulagram with possible thrombectomy and intervention versus possible tunneled dialysis catheter placement if this proves unsuccessful.  Plan will be for this to be performed likely on Wednesday afternoon 2023.  3.  Risks including but not limited to bleeding, infection, need for multiple procedures, limb loss, pneumothorax, cardiac and respiratory compromise and death as well as benefits and indications regarding above procedure were discussed with patient who voiced understanding and willingness to proceed.  All questions were answered.  4.  Past medical history significant for diabetes mellitus type 2, hypertension, hypothyroidism, COPD, coronary artery disease status post coronary stenting x3 in  and previous CABG 30 years ago and dyslipidemia.  5.  We will continue to monitor with supportive care.  6.  N.p.o. after midnight on 2023.  Patient will be  started on heart healthy diabetic diet at this time until then.  7.  LUE fistula duplex reviewed. Acute thrombus to mid and distal fistula with thrombosis.    Active Hospital Problems:  Active Hospital Problems    Diagnosis    • **Malfunction of arteriovenous dialysis fistula, initial encounter    • Hyperkalemia    • Deafness    • History of anemia due to chronic kidney disease    • End stage renal disease on dialysis            Electronically signed by Byron Kennedy MD, 05/16/23, 5:39 PM EDT.

## 2023-05-16 NOTE — CONSULTS
[facility]   VASCULAR SURGERY CONSULT    Patient Name: Del Cueva  : 1940  MRN: [id number]  Primary Care Physician:  Provider, No Known  Date of admission: 5/15/2023    Subjective   Subjective     Chief Complaint: Thrombosed left upper extremity AV fistula    HPI:    Del Cueva is a 82 y.o. male who is right-hand dominant with history of left upper extremity AV fistula vein transposition on 3/30/2022 who has been experiencing some dysfunction to the fistula over the past few weeks.  His last full run of dialysis was last Wednesday and he was only able to run half round of dialysis on Friday.  He was previously scheduled for a fistulogram by Dr. Rojas after being seen by him in the office however unfortunately presented to the hospital today after attempting to undergo dialysis and they were unable to access the fistula.  He presents with evidence of a thrombosed left brachiobasilic AV fistula.   His potassium today is 5.1.  He was admitted per the medicine team and nephrology has been consulted.  Patient denies any recent chest pain, chest tightness, shortness of breath, fevers, chills or night sweats.  Given his thrombosed fistula vascular surgery was consulted.    Review of Systems    General: Patient denies unintended weight loss or weight gain.  Denies fatigue, and weakness.  Denies any fevers, chills or night sweats.  HEENT: Denies any recent visual or hearing changes.  Denies any headaches, rhinorrhea or epistaxis.  Cardiac: Patient denies any significant chest pain, chest tightness or palpitations.  Denies dyspnea on exertion or denies any paroxysmal nocturnal dyspnea orthopnea.  Respiratory: Patient denies any shortness of breath, cough, sputum production or hemoptysis.    Gastrointestinal: Patient denies any significant changes in appetite, nausea, vomiting or dysphagia.  Denies any hematemesis, melena or hematochezia.  Denies any abdominal pain or tenderness to  palpation.  Genitourinary: Patient denies any oliguria, dysuria or hematuria.  Vascular: Patient denies any claudication, rest pain or tissue loss.  Denies any history of thrombosis or embolic phenomenon.  Endocrine: Denies any hot or cold intolerance, is not a diabetic and denies any thyroid problems.  Neurologic: Patient denies any loss of sensation, numbness or tingling.  Denies any syncopal episodes, blackouts or seizure history.  Psychiatric: Patient denies any anxiety, depression or mood disorder.       Personal History     Past Medical History:   Diagnosis Date   • Anemia    • Arthritis    • CHF (congestive heart failure)    • Chronic renal disease, stage 4, severely decreased glomerular filtration rate (GFR) between 15-29 mL/min/1.73 square meter    • COPD (chronic obstructive pulmonary disease)     NO INHALERS OR NEBULIZER   • Coronary artery disease    • Diabetes mellitus     BS NOT CHECKED DAILY   • Disease of thyroid gland    • Hyperlipidemia    • Hypertension    • MI (myocardial infarction)     3 STENT PLACED JAN 2021 [facility]ROUGH [facility]. FOLLOWED BY DR BETHEA   • SOB (shortness of breath)     REPORTS CHRONIC ISSUE HX OF COPD       Past Surgical History:   Procedure Laterality Date   • ARTERIOVENOUS FISTULA/SHUNT SURGERY Left 3/30/2022    Procedure: LEFT UPPER ARM BASILIC VEIN TRANSPOSITION;  Surgeon: Rigo Palmer MD;  Location: [facility];  Service: Vascular;  Laterality: Left;   • CORONARY ANGIOPLASTY WITH STENT PLACEMENT  01/15/2021   • CORONARY ARTERY BYPASS GRAFT      DAUGHTER REPORTS OVER 30 YEARS AGO   • SHOULDER ROTATOR CUFF REPAIR Left        Family History: family history is not on file. Otherwise pertinent FHx was reviewed and not pertinent to current issue.    Social History:  reports that he quit smoking about 36 years ago. His smoking use included cigarettes. His smokeless tobacco use includes chew. He reports that he does not drink alcohol and does not use drugs.  He is  with  3 children and lives in [place].    Home Medications:  aspirin, atorvastatin, bumetanide, clopidogrel, ferrous sulfate, glipizide, levothyroxine, metoprolol tartrate, nateglinide, sodium bicarbonate, and tamsulosin      Allergies:  No Known Allergies    Objective   Objective     Vitals:   Temp:  [98.1 °F (36.7 °C)] 98.1 °F (36.7 °C)  Heart Rate:  [74-93] 75  Resp:  [16-18] 18  BP: (120-180)/() 156/83    Physical Exam    General: Awake, alert, NAD   Eyes:  WIN, EOMI, Sclera non-icteric   Neck: Supple, no LAD or carotid bruits present   Lungs: Clear to auscultation B   Heart: RRR   Abdomen: Soft, nontender, nondistended with positive bowel sounds   Ext: No clubbing cyanosis or edema.  B radial and femoral pulses palp.  Left upper extremity AV fistula with evidence of thrombosis and no thrill or bruit and possible infiltration with some bruising.   Neuro: CN's II-XII grossly intact.  No focal or lateralizing deficits present currently.    Pertinent Lab Data:    CBC:      Lab 05/15/23  1543   WBC 7.20   HEMOGLOBIN 11.3*   HEMATOCRIT 33.9*   PLATELETS 194   NEUTROS ABS 5.12   IMMATURE GRANS (ABS) 0.05   LYMPHS ABS 0.96   MONOS ABS 0.66   EOS ABS 0.33   MCV 96.6        CMP:      Lab 05/15/23  1543   SODIUM 137   POTASSIUM 5.1   CHLORIDE 103   CO2 21.8*   ANION GAP 12.2   BUN 54*   CREATININE 5.02*   EGFR 10.9*   GLUCOSE 111*   CALCIUM 9.0        No radiology results for the last 7 days      Assessment & Plan   Assessment / Plan     Active Hospital Problems:  Active Hospital Problems    Diagnosis    • **Malfunction of arteriovenous dialysis fistula, initial encounter    • End stage renal disease on dialysis        Assessment/plan:   82-year-old  male with recent left brachiobasilic AV fistula dysfunction now presenting with evidence of thrombosis.  Last full dialysis on last Wednesday.  2.  Discussed with patient the need for attempt at left brachiobasilic AV fistulagram with possible thrombectomy and  intervention versus possible tunneled dialysis catheter placement if this proves unsuccessful.  Plan will be for this to be performed likely on Wednesday afternoon 5/17/2023.  3.  Risks including but not limited to bleeding, infection, need for multiple procedures, limb loss, pneumothorax, cardiac and respiratory compromise and death as well as benefits and indications regarding above procedure were discussed with patient who voiced understanding and willingness to proceed.  All questions were answered.  4.  Past medical history significant for diabetes mellitus type 2, hypertension, hypothyroidism, COPD, coronary artery disease status post coronary stenting x3 in 2021 and previous CABG 30 years ago and dyslipidemia.  5.  We will continue to monitor with supportive care.  6.  N.p.o. after midnight on 5/17/2023.  Patient will be started on heart healthy diabetic diet at this time until then.  7.  We will obtain a left upper extremity fistula duplex on tomorrow for further evaluation.      Thank you for the opportunity see this patient in consultation.  Electronically signed by Byron Kennedy MD, 05/15/23, 9:06 PM EDT.

## 2023-05-17 LAB
ABO GROUP BLD: NORMAL
ABO GROUP BLD: NORMAL
ALBUMIN SERPL-MCNC: 3.7 G/DL (ref 3.5–5.2)
ALBUMIN/GLOB SERPL: 1.3 G/DL
ALP SERPL-CCNC: 107 U/L (ref 39–117)
ALT SERPL W P-5'-P-CCNC: 10 U/L (ref 1–41)
ANION GAP SERPL CALCULATED.3IONS-SCNC: 14.5 MMOL/L (ref 5–15)
AST SERPL-CCNC: 13 U/L (ref 1–40)
BASOPHILS # BLD AUTO: 0.08 10*3/MM3 (ref 0–0.2)
BASOPHILS NFR BLD AUTO: 1.3 % (ref 0–1.5)
BILIRUB SERPL-MCNC: 0.5 MG/DL (ref 0–1.2)
BLD GP AB SCN SERPL QL: NEGATIVE
BUN SERPL-MCNC: 60 MG/DL (ref 8–23)
BUN/CREAT SERPL: 11.2 (ref 7–25)
CALCIUM SPEC-SCNC: 8.7 MG/DL (ref 8.6–10.5)
CHLORIDE SERPL-SCNC: 104 MMOL/L (ref 98–107)
CO2 SERPL-SCNC: 19.5 MMOL/L (ref 22–29)
CREAT SERPL-MCNC: 5.34 MG/DL (ref 0.76–1.27)
DEPRECATED RDW RBC AUTO: 54.7 FL (ref 37–54)
EGFRCR SERPLBLD CKD-EPI 2021: 10.1 ML/MIN/1.73
EOSINOPHIL # BLD AUTO: 0.3 10*3/MM3 (ref 0–0.4)
EOSINOPHIL NFR BLD AUTO: 4.8 % (ref 0.3–6.2)
ERYTHROCYTE [DISTWIDTH] IN BLOOD BY AUTOMATED COUNT: 16 % (ref 12.3–15.4)
GLOBULIN UR ELPH-MCNC: 2.9 GM/DL
GLUCOSE BLDC GLUCOMTR-MCNC: 87 MG/DL (ref 70–99)
GLUCOSE BLDC GLUCOMTR-MCNC: 96 MG/DL (ref 70–99)
GLUCOSE SERPL-MCNC: 120 MG/DL (ref 65–99)
HCT VFR BLD AUTO: 35.6 % (ref 37.5–51)
HGB BLD-MCNC: 11.7 G/DL (ref 13–17.7)
IMM GRANULOCYTES # BLD AUTO: 0.05 10*3/MM3 (ref 0–0.05)
IMM GRANULOCYTES NFR BLD AUTO: 0.8 % (ref 0–0.5)
LYMPHOCYTES # BLD AUTO: 0.92 10*3/MM3 (ref 0.7–3.1)
LYMPHOCYTES NFR BLD AUTO: 14.8 % (ref 19.6–45.3)
MAGNESIUM SERPL-MCNC: 1.3 MG/DL (ref 1.6–2.4)
MCH RBC QN AUTO: 31.5 PG (ref 26.6–33)
MCHC RBC AUTO-ENTMCNC: 32.9 G/DL (ref 31.5–35.7)
MCV RBC AUTO: 95.7 FL (ref 79–97)
MONOCYTES # BLD AUTO: 0.62 10*3/MM3 (ref 0.1–0.9)
MONOCYTES NFR BLD AUTO: 10 % (ref 5–12)
NEUTROPHILS NFR BLD AUTO: 4.25 10*3/MM3 (ref 1.7–7)
NEUTROPHILS NFR BLD AUTO: 68.3 % (ref 42.7–76)
NRBC BLD AUTO-RTO: 0 /100 WBC (ref 0–0.2)
PHOSPHATE SERPL-MCNC: 4.1 MG/DL (ref 2.5–4.5)
PLATELET # BLD AUTO: 191 10*3/MM3 (ref 140–450)
PMV BLD AUTO: 10.2 FL (ref 6–12)
POTASSIUM SERPL-SCNC: 5.2 MMOL/L (ref 3.5–5.2)
PROT SERPL-MCNC: 6.6 G/DL (ref 6–8.5)
QT INTERVAL: 406 MS
RBC # BLD AUTO: 3.72 10*6/MM3 (ref 4.14–5.8)
RH BLD: POSITIVE
RH BLD: POSITIVE
SODIUM SERPL-SCNC: 138 MMOL/L (ref 136–145)
T&S EXPIRATION DATE: NORMAL
WBC NRBC COR # BLD: 6.22 10*3/MM3 (ref 3.4–10.8)

## 2023-05-17 PROCEDURE — 82948 REAGENT STRIP/BLOOD GLUCOSE: CPT

## 2023-05-17 PROCEDURE — 94799 UNLISTED PULMONARY SVC/PX: CPT

## 2023-05-17 PROCEDURE — 86850 RBC ANTIBODY SCREEN: CPT | Performed by: SURGERY

## 2023-05-17 PROCEDURE — 94761 N-INVAS EAR/PLS OXIMETRY MLT: CPT

## 2023-05-17 PROCEDURE — 86900 BLOOD TYPING SEROLOGIC ABO: CPT | Performed by: SURGERY

## 2023-05-17 PROCEDURE — 86901 BLOOD TYPING SEROLOGIC RH(D): CPT | Performed by: SURGERY

## 2023-05-17 PROCEDURE — 99232 SBSQ HOSP IP/OBS MODERATE 35: CPT | Performed by: INTERNAL MEDICINE

## 2023-05-17 PROCEDURE — 83735 ASSAY OF MAGNESIUM: CPT | Performed by: INTERNAL MEDICINE

## 2023-05-17 PROCEDURE — 85025 COMPLETE CBC W/AUTO DIFF WBC: CPT | Performed by: INTERNAL MEDICINE

## 2023-05-17 PROCEDURE — 84100 ASSAY OF PHOSPHORUS: CPT | Performed by: INTERNAL MEDICINE

## 2023-05-17 PROCEDURE — 25010000002 HEPARIN (PORCINE) PER 1000 UNITS: Performed by: INTERNAL MEDICINE

## 2023-05-17 PROCEDURE — 80053 COMPREHEN METABOLIC PANEL: CPT | Performed by: INTERNAL MEDICINE

## 2023-05-17 RX ADMIN — HEPARIN SODIUM 5000 UNITS: 5000 INJECTION INTRAVENOUS; SUBCUTANEOUS at 21:08

## 2023-05-17 RX ADMIN — ATORVASTATIN CALCIUM 40 MG: 40 TABLET, FILM COATED ORAL at 21:08

## 2023-05-17 RX ADMIN — Medication 10 ML: at 21:08

## 2023-05-17 RX ADMIN — Medication 10 ML: at 09:02

## 2023-05-17 RX ADMIN — SODIUM ZIRCONIUM CYCLOSILICATE 10 G: 10 POWDER, FOR SUSPENSION ORAL at 17:42

## 2023-05-17 RX ADMIN — BUMETANIDE 2 MG: 1 TABLET ORAL at 21:08

## 2023-05-17 RX ADMIN — SODIUM BICARBONATE 650 MG TABLET 1300 MG: at 21:08

## 2023-05-17 RX ADMIN — TAMSULOSIN HYDROCHLORIDE 0.4 MG: 0.4 CAPSULE ORAL at 21:08

## 2023-05-17 RX ADMIN — SODIUM ZIRCONIUM CYCLOSILICATE 10 G: 10 POWDER, FOR SUSPENSION ORAL at 21:08

## 2023-05-17 RX ADMIN — METOPROLOL TARTRATE 25 MG: 25 TABLET, FILM COATED ORAL at 21:08

## 2023-05-17 NOTE — PLAN OF CARE
Goal Outcome Evaluation:  Plan of Care Reviewed With: patient, daughter   No acute events this shift.  No complaints of pain.  VSS.     Progress: improving

## 2023-05-17 NOTE — PROGRESS NOTES
[facility]     Progress Note    Patient Name: Del Cueva  : 1940  MRN: [id number]  Primary Care Physician:  Provider, No Known  Date of admission: 5/15/2023    Subjective Patient is very hard of hearing and little bit confused but otherwise awake alert responsive  Hard to communicate with the patient because of deafness    Review of Systems  Constitutional:        Weakness tiredness fatigue  Eyes:                       No blurry vision, eye discharge, eye irritation, eye pain  HEENT:                   No acute hair loss, earache and discharge, nasal congestion or discharge, sore throat, postnasal drip  Respiratory:           No shortness of breath coughing sputum production wheezing hemoptysis pleuritic chest pain  Cardiovascular:     No chest pain, orthopnea, PND, dizziness, palpitation, lower extremity edema  Gastrointestinal:   No nausea vomiting diarrhea abdominal pain constipation  Genitourinary:       No urinary incontinence, hesitancy, frequency, urgency, dysuria  Hematologic:         No bruising, bleeding, pallor, lymphadenopathy  Endocrine:            No coldness, hot flashes, polyuria, abnormal hair growth  Musculoskeletal:    No body pains, aches, arthritic pains, muscle pain ,muscle wasting  Psychiatric:          No low or high mood, anxiety, hallucinations, delusions  Skin.                      No rash, ulcers, bruising, itching  Neurological:        No confusion, headache, focal weakness, numbness, dysphasia    Objective   Objective     Vitals:   Temp:  [97.3 °F (36.3 °C)-98.4 °F (36.9 °C)] 98.1 °F (36.7 °C)  Heart Rate:  [79-88] 80  Resp:  [16-20] 16  BP: (126-174)/(50-83) 150/63  Physical Exam    Constitutional: Awake, alert responsive, conversant, no obvious distress              Psychiatric:  Appropriate affect, cooperative   Neurologic:  Awake alert ,oriented x 3, strength symmetric in all extremities, Cranial Nerves grossly intact to confrontation, speech  clear   Eyes:   PERRLA, sclerae anicteric, no conjunctival injection   HEENT:  Moist mucous membranes, no nasal or eye discharge, no throat congestion   Neck:   Supple, no thyromegaly, no lymphadenopathy, trachea midline, no elevated JVD   Respiratory:  Clear to auscultation bilaterally, nonlabored respirations    Cardiovascular: RRR, no murmurs, rubs, or gallops, palpable pedal pulses bilaterally, No bilateral ankle edema   Gastrointestinal: Positive bowel sounds, soft, nontender, nondistended, no organomegaly   Musculoskeletal:  No clubbing or cyanosis to extremities,muscle wasting, joint swelling, muscle weakness             Skin:                      No rashes, bruising, skin ulcers, petechiae or ecchymosis    Result Review    Result Review:  I have personally reviewed the results from the time of this admission to 5/17/2023 09:20 EDT and agree with these findings:  []  Laboratory  []  Microbiology  []  Radiology  []  EKG/Telemetry   []  Cardiology/Vascular   []  Pathology  []  Old records  []  Other:    Assessment & Plan   Assessment / Plan       Active Hospital Problems:    Active Hospital Problems    Diagnosis  POA   • **Malfunction of arteriovenous dialysis fistula, initial encounter [T82.590A]  Yes   • Hyperkalemia [E87.5]  Unknown   • Deafness [H91.90]  Unknown   • History of anemia due to chronic kidney disease [N18.9, Z86.2]  Not Applicable   • End stage renal disease on dialysis [N18.6, Z99.2]  Not Applicable     Added automatically from request for surgery [id number]         Plan:   We are hoping declotting of the AV fistula today and then dialysis after that       Electronically signed by Cee Mcmahon MD, 05/17/23, 9:20 AM EDT.

## 2023-05-17 NOTE — PROGRESS NOTES
[facility]   Hospitalist Progress Note       Patient Name: Del Cueva  : 1940  MRN: [id number]  Primary Care Physician: Provider, No Known  Date of admission: 5/15/2023  Today's Date: 2023  Room / Bed:   210/1  Subjective   Chief Complaint: Malfunctioning AV fistula    Summary:  Del Cueva is a 82 y.o. male past medical history of end-stage renal disease on dialysis, COPD, coronary disease with MI and 3 stents placed, type 2 diabetes, hypothyroidism, and hypertension who came in after failing to get dialysis secondary to a malfunctioning AV fistula    Interval Followup: 2023    Patient alert.  Resting in bed.  Hard of hearing.  Poor historian in general.  He denies any new complaints or overnight issues.  Vascular surgeon planning thrombectomy or TDC this afternoon  No events on monitor  Vital signs stable  K 5.2      REVIEW OF SYSTEMS: All other systems reviewed and are negative.   Fatigue.  Objective   Temp:  [97.3 °F (36.3 °C)-98.4 °F (36.9 °C)] 98.1 °F (36.7 °C)  Heart Rate:  [80-88] 80  Resp:  [16-20] 16  BP: (126-174)/(50-83) 150/63  PHYSICAL EXAM   CON: WN. WD. NAD.  Elderly and chronically ill-appearing.  Hard of hearing.  EYES:  Sclera anicteric. EOMI. Normal conjunctiva.   ENT:  Oropharyngeal mucosa without ulcers or thrush.    NECK:  No thyromegaly. No stridor. Trachea midline.  RESP:  CTA. No wheezes. No crackles.  No work of breathing or tachypnea.   CV:  Rhythm regular. Rate WNL. No murmur noted.  No edema.  GI:  Soft and nontender. Nondistended.  Bowel sounds present.   EXT: Peripheral pulses intact.  No joint deformities or cyanosis.  LYMPH:  No lymphedema noted.  No cervical lymphadenopathy.  PSYCH:  Alert.  Vague historian.  NEURO:  No unilateral weakness or paresthesia.  SKIN: No chronic venous stasis changes or varicosities.  No cellulitis    Results from last 7 days   Lab Units 23  0514 23  0506 05/15/23  1543   WBC 10*3/mm3 6.22 6.61 7.20    HEMOGLOBIN g/dL 11.7* 11.8* 11.3*   HEMATOCRIT % 35.6* 35.8* 33.9*   PLATELETS 10*3/mm3 191 200 194     Results from last 7 days   Lab Units 05/17/23  0514 05/16/23  0506 05/15/23  1543   SODIUM mmol/L 138 137 137   POTASSIUM mmol/L 5.2 5.4* 5.1   CO2 mmol/L 19.5* 18.1* 21.8*   CHLORIDE mmol/L 104 107 103   ANION GAP mmol/L 14.5 11.9 12.2   BUN mg/dL 60* 53* 54*   CREATININE mg/dL 5.34* 5.02* 5.02*   GLUCOSE mg/dL 120* 153* 111*           RESULTS REVIEWED:  I have personally reviewed the results from the time of this admission to 5/17/2023 11:36 EDT and agree with these findings:  []  Laboratory  []  Microbiology  []  Radiology  []  EKG/Telemetry   []  Cardiology/Vascular   []  Pathology  []  Old records  []  Other:  Assessment / Plan   Assessment:    Malfunctioning AV fistula (Acute thrombus to mid and distal fistula with thrombosis)  End-stage renal disease on hemodialysis  CAD/stents 2021/CABGx30 yrs ago  DM  COPD, stable  Hypothyroidism  Deafness  Anemia from CKD     Plan:  Monitored bed  Nephrology consulted  Vascular surgeon consulted  Continue current diuretics: Bumex  SSI for glucose control  Additional recommendations pending clinical course    Discussed plan with RN.  DVT prophylaxis:  Medical DVT prophylaxis orders are present.  CODE STATUS:      Level Of Support Discussed With: Patient  Code Status (Patient has no pulse and is not breathing): CPR (Attempt to Resuscitate)  Medical Interventions (Patient has pulse or is breathing): Full Support       Electronically signed by CHEN Simental, 05/17/23, 11:36 AM EDT.    Patient independently seen and evaluated, agree with assessment and plan, above documentation reflects plan put forth during bedside rounds.  More than 51% of the time of this patient encounter was performed by me.    Interval history;  No acute events overnight, patient does have some increased confusion this a.m.  Patient awaiting on vascular surgery to correct malfunctioning AV  fistula    Exam:  GEN: No acute distress  HEENT: Moist mucous membranes  LUNGS: Equal chest rise bilaterally  NEURO: Moving all 4 extremities spontaneously  SKIN: No obvious breakdown    Plan:  Agree with assessment and plan as above  Nephrology consulted, appreciate their recommendations  Vascular surgery consulted, planning on correcting AV fistula today  Monitor electrolytes and replace as needed  Continue Bumex for volume control  Frequent reorientation  Sliding-scale insulin for hyperglycemia  Continue sodium bicarb  Continue metoprolol  CBC, CMP personally reviewed  Repeat CBC, CMP, mag and Phos in a.m.  Clinical course will dictate further management    Reviewed patients labs and imaging, and discussed with patient and nurse at bedside.      Electronically signed by Hernandez Hylton MD, 05/17/23, 12:14 PM EDT.

## 2023-05-17 NOTE — PROGRESS NOTES
Unfortunately due to emergent cases in cath lab I was unable to perfom Mr. Cueva procedure today. He will be fed and made NPO after midnight with plan for procedure tomorrow at 3 PM.  Message left with his daughter Ms. Harrison and discussed plan with patient who voiced understanding.

## 2023-05-17 NOTE — PLAN OF CARE
Goal Outcome Evaluation:              Outcome Evaluation: VSS. Pt was confused most of the night , but easy to redirect. Npo midnight for Surgery today. Consent signed. Update pt. family. Continue monitor.

## 2023-05-18 LAB
ALBUMIN SERPL-MCNC: 3.7 G/DL (ref 3.5–5.2)
ALBUMIN/GLOB SERPL: 1.3 G/DL
ALP SERPL-CCNC: 98 U/L (ref 39–117)
ALT SERPL W P-5'-P-CCNC: 9 U/L (ref 1–41)
ANION GAP SERPL CALCULATED.3IONS-SCNC: 16.9 MMOL/L (ref 5–15)
AST SERPL-CCNC: 12 U/L (ref 1–40)
BASOPHILS # BLD AUTO: 0.07 10*3/MM3 (ref 0–0.2)
BASOPHILS NFR BLD AUTO: 1.2 % (ref 0–1.5)
BILIRUB SERPL-MCNC: 0.4 MG/DL (ref 0–1.2)
BUN SERPL-MCNC: 67 MG/DL (ref 8–23)
BUN/CREAT SERPL: 10.9 (ref 7–25)
CALCIUM SPEC-SCNC: 8.7 MG/DL (ref 8.6–10.5)
CHLORIDE SERPL-SCNC: 104 MMOL/L (ref 98–107)
CO2 SERPL-SCNC: 20.1 MMOL/L (ref 22–29)
CREAT SERPL-MCNC: 6.16 MG/DL (ref 0.76–1.27)
DEPRECATED RDW RBC AUTO: 54.6 FL (ref 37–54)
EGFRCR SERPLBLD CKD-EPI 2021: 8.5 ML/MIN/1.73
EOSINOPHIL # BLD AUTO: 0.23 10*3/MM3 (ref 0–0.4)
EOSINOPHIL NFR BLD AUTO: 3.9 % (ref 0.3–6.2)
ERYTHROCYTE [DISTWIDTH] IN BLOOD BY AUTOMATED COUNT: 16.2 % (ref 12.3–15.4)
GLOBULIN UR ELPH-MCNC: 2.8 GM/DL
GLUCOSE BLDC GLUCOMTR-MCNC: 109 MG/DL (ref 70–99)
GLUCOSE SERPL-MCNC: 101 MG/DL (ref 65–99)
HCT VFR BLD AUTO: 34.9 % (ref 37.5–51)
HGB BLD-MCNC: 11.6 G/DL (ref 13–17.7)
IMM GRANULOCYTES # BLD AUTO: 0.04 10*3/MM3 (ref 0–0.05)
IMM GRANULOCYTES NFR BLD AUTO: 0.7 % (ref 0–0.5)
LYMPHOCYTES # BLD AUTO: 0.84 10*3/MM3 (ref 0.7–3.1)
LYMPHOCYTES NFR BLD AUTO: 14.3 % (ref 19.6–45.3)
MAGNESIUM SERPL-MCNC: 1.4 MG/DL (ref 1.6–2.4)
MCH RBC QN AUTO: 31.4 PG (ref 26.6–33)
MCHC RBC AUTO-ENTMCNC: 33.2 G/DL (ref 31.5–35.7)
MCV RBC AUTO: 94.6 FL (ref 79–97)
MONOCYTES # BLD AUTO: 0.57 10*3/MM3 (ref 0.1–0.9)
MONOCYTES NFR BLD AUTO: 9.7 % (ref 5–12)
NEUTROPHILS NFR BLD AUTO: 4.13 10*3/MM3 (ref 1.7–7)
NEUTROPHILS NFR BLD AUTO: 70.2 % (ref 42.7–76)
NRBC BLD AUTO-RTO: 0 /100 WBC (ref 0–0.2)
PHOSPHATE SERPL-MCNC: 5.3 MG/DL (ref 2.5–4.5)
PLATELET # BLD AUTO: 199 10*3/MM3 (ref 140–450)
PMV BLD AUTO: 10.4 FL (ref 6–12)
POTASSIUM SERPL-SCNC: 4.6 MMOL/L (ref 3.5–5.2)
PROT SERPL-MCNC: 6.5 G/DL (ref 6–8.5)
RBC # BLD AUTO: 3.69 10*6/MM3 (ref 4.14–5.8)
SODIUM SERPL-SCNC: 141 MMOL/L (ref 136–145)
WBC NRBC COR # BLD: 5.88 10*3/MM3 (ref 3.4–10.8)

## 2023-05-18 PROCEDURE — 25010000002 HEPARIN (PORCINE) PER 1000 UNITS: Performed by: INTERNAL MEDICINE

## 2023-05-18 PROCEDURE — 84100 ASSAY OF PHOSPHORUS: CPT | Performed by: INTERNAL MEDICINE

## 2023-05-18 PROCEDURE — 80053 COMPREHEN METABOLIC PANEL: CPT | Performed by: INTERNAL MEDICINE

## 2023-05-18 PROCEDURE — 25010000002 FENTANYL CITRATE (PF) 50 MCG/ML SOLUTION: Performed by: RADIOLOGY

## 2023-05-18 PROCEDURE — 99152 MOD SED SAME PHYS/QHP 5/>YRS: CPT | Performed by: RADIOLOGY

## 2023-05-18 PROCEDURE — 03HY33Z INSERTION OF INFUSION DEVICE INTO UPPER ARTERY, PERCUTANEOUS APPROACH: ICD-10-PCS | Performed by: RADIOLOGY

## 2023-05-18 PROCEDURE — 25010000002 MIDAZOLAM PER 1MG: Performed by: RADIOLOGY

## 2023-05-18 PROCEDURE — 0JH63XZ INSERTION OF TUNNELED VASCULAR ACCESS DEVICE INTO CHEST SUBCUTANEOUS TISSUE AND FASCIA, PERCUTANEOUS APPROACH: ICD-10-PCS | Performed by: RADIOLOGY

## 2023-05-18 PROCEDURE — 25010000002 HEPARIN (PORCINE) PER 1000 UNITS: Performed by: PHYSICIAN ASSISTANT

## 2023-05-18 PROCEDURE — C1887 CATHETER, GUIDING: HCPCS | Performed by: RADIOLOGY

## 2023-05-18 PROCEDURE — 25010000002 HEPARIN (PORCINE) PER 1000 UNITS: Performed by: RADIOLOGY

## 2023-05-18 PROCEDURE — 25010000002 HYDRALAZINE PER 20 MG: Performed by: RADIOLOGY

## 2023-05-18 PROCEDURE — 82948 REAGENT STRIP/BLOOD GLUCOSE: CPT

## 2023-05-18 PROCEDURE — 83735 ASSAY OF MAGNESIUM: CPT | Performed by: PHYSICIAN ASSISTANT

## 2023-05-18 PROCEDURE — 25010000002 CEFAZOLIN 1-4 GM/50ML-% SOLUTION: Performed by: RADIOLOGY

## 2023-05-18 PROCEDURE — C1750 CATH, HEMODIALYSIS,LONG-TERM: HCPCS | Performed by: RADIOLOGY

## 2023-05-18 PROCEDURE — 99153 MOD SED SAME PHYS/QHP EA: CPT | Performed by: RADIOLOGY

## 2023-05-18 PROCEDURE — C1894 INTRO/SHEATH, NON-LASER: HCPCS | Performed by: RADIOLOGY

## 2023-05-18 PROCEDURE — 25510000001 IOPAMIDOL PER 1 ML: Performed by: RADIOLOGY

## 2023-05-18 PROCEDURE — 99233 SBSQ HOSP IP/OBS HIGH 50: CPT | Performed by: INTERNAL MEDICINE

## 2023-05-18 PROCEDURE — 36558 INSERT TUNNELED CV CATH: CPT | Performed by: RADIOLOGY

## 2023-05-18 PROCEDURE — 85025 COMPLETE CBC W/AUTO DIFF WBC: CPT | Performed by: PHYSICIAN ASSISTANT

## 2023-05-18 PROCEDURE — C1757 CATH, THROMBECTOMY/EMBOLECT: HCPCS | Performed by: RADIOLOGY

## 2023-05-18 PROCEDURE — 77001 FLUOROGUIDE FOR VEIN DEVICE: CPT | Performed by: RADIOLOGY

## 2023-05-18 PROCEDURE — 94799 UNLISTED PULMONARY SVC/PX: CPT

## 2023-05-18 PROCEDURE — 5A1D70Z PERFORMANCE OF URINARY FILTRATION, INTERMITTENT, LESS THAN 6 HOURS PER DAY: ICD-10-PCS | Performed by: INTERNAL MEDICINE

## 2023-05-18 PROCEDURE — C1769 GUIDE WIRE: HCPCS | Performed by: RADIOLOGY

## 2023-05-18 RX ORDER — CEFAZOLIN SODIUM 1 G/50ML
INJECTION, SOLUTION INTRAVENOUS
Status: COMPLETED | OUTPATIENT
Start: 2023-05-18 | End: 2023-05-18

## 2023-05-18 RX ORDER — LIDOCAINE HYDROCHLORIDE 20 MG/ML
INJECTION, SOLUTION INFILTRATION; PERINEURAL
Status: DISCONTINUED | OUTPATIENT
Start: 2023-05-18 | End: 2023-05-18 | Stop reason: HOSPADM

## 2023-05-18 RX ORDER — MIDAZOLAM HYDROCHLORIDE 2 MG/2ML
INJECTION, SOLUTION INTRAMUSCULAR; INTRAVENOUS
Status: DISCONTINUED | OUTPATIENT
Start: 2023-05-18 | End: 2023-05-18 | Stop reason: HOSPADM

## 2023-05-18 RX ORDER — HEPARIN SODIUM 1000 [USP'U]/ML
5000 INJECTION, SOLUTION INTRAVENOUS; SUBCUTANEOUS AS NEEDED
Status: DISCONTINUED | OUTPATIENT
Start: 2023-05-18 | End: 2023-05-19 | Stop reason: HOSPADM

## 2023-05-18 RX ORDER — FENTANYL CITRATE 50 UG/ML
INJECTION, SOLUTION INTRAMUSCULAR; INTRAVENOUS
Status: DISCONTINUED | OUTPATIENT
Start: 2023-05-18 | End: 2023-05-18 | Stop reason: HOSPADM

## 2023-05-18 RX ORDER — HYDRALAZINE HYDROCHLORIDE 20 MG/ML
INJECTION INTRAMUSCULAR; INTRAVENOUS
Status: DISCONTINUED | OUTPATIENT
Start: 2023-05-18 | End: 2023-05-18 | Stop reason: HOSPADM

## 2023-05-18 RX ORDER — CEFAZOLIN SODIUM 2 G/100ML
2 INJECTION, SOLUTION INTRAVENOUS ONCE
Status: DISCONTINUED | OUTPATIENT
Start: 2023-05-18 | End: 2023-05-19 | Stop reason: HOSPADM

## 2023-05-18 RX ORDER — HEPARIN SODIUM 1000 [USP'U]/ML
INJECTION, SOLUTION INTRAVENOUS; SUBCUTANEOUS
Status: DISCONTINUED | OUTPATIENT
Start: 2023-05-18 | End: 2023-05-18 | Stop reason: HOSPADM

## 2023-05-18 RX ADMIN — CLOPIDOGREL BISULFATE 75 MG: 75 TABLET ORAL at 08:29

## 2023-05-18 RX ADMIN — Medication 10 ML: at 08:30

## 2023-05-18 RX ADMIN — ATORVASTATIN CALCIUM 40 MG: 40 TABLET, FILM COATED ORAL at 22:31

## 2023-05-18 RX ADMIN — ASPIRIN 81 MG: 81 TABLET, COATED ORAL at 08:29

## 2023-05-18 RX ADMIN — LEVOTHYROXINE SODIUM 150 MCG: 150 TABLET ORAL at 08:29

## 2023-05-18 RX ADMIN — HEPARIN SODIUM 5000 UNITS: 5000 INJECTION INTRAVENOUS; SUBCUTANEOUS at 22:29

## 2023-05-18 RX ADMIN — SODIUM BICARBONATE 650 MG TABLET 1300 MG: at 08:29

## 2023-05-18 RX ADMIN — Medication 10 ML: at 22:32

## 2023-05-18 RX ADMIN — HEPARIN SODIUM 5000 UNITS: 5000 INJECTION INTRAVENOUS; SUBCUTANEOUS at 08:30

## 2023-05-18 RX ADMIN — BUMETANIDE 2 MG: 1 TABLET ORAL at 08:29

## 2023-05-18 RX ADMIN — METOPROLOL TARTRATE 25 MG: 25 TABLET, FILM COATED ORAL at 22:29

## 2023-05-18 RX ADMIN — BUMETANIDE 2 MG: 1 TABLET ORAL at 22:29

## 2023-05-18 RX ADMIN — METOPROLOL TARTRATE 25 MG: 25 TABLET, FILM COATED ORAL at 08:30

## 2023-05-18 RX ADMIN — TAMSULOSIN HYDROCHLORIDE 0.4 MG: 0.4 CAPSULE ORAL at 22:28

## 2023-05-18 NOTE — PROGRESS NOTES
Unable to perform declot of patient's fistula yesterday due to emergent STEMI requiring cardiac cath with intervention yesterday.  I have discussed patient with Dr. Laird per IR who is here today and he is willing to attempt declot of patient's L BBAVF with possible TDC.  Patient and his daughter update yesterday evening as to the reason for delay and were understanding.  Appreciate IR's care.

## 2023-05-18 NOTE — PROGRESS NOTES
[facility]       Patient Name: Del Cueva  : 1940  MRN: [id number]  Primary Care Physician: Provider, No Known  Date of admission: 5/15/2023  Today's Date: 2023  Room / Bed:   210/1  Subjective   Chief Complaint: Malfunctioning AV fistula    Summary:  Del Cueva is a 82 y.o. male past medical history of end-stage renal disease on dialysis, COPD, coronary disease with MI and 3 stents placed, type 2 diabetes, hypothyroidism, and hypertension who came in after failing to get dialysis secondary to a malfunctioning AV fistula    Interval Followup: 2023    • No new complaints or overnight issues.  Patient resting in bed.  • Procedure planned for today regarding malfunctioning AV fistula  • Patient alert.  Resting in bed.  Hard of hearing.  Poor historian in general.  • Normal sinus rhythm with occasional PACs   • No signs stable  • K 4.6      REVIEW OF SYSTEMS: All other systems reviewed and are negative.   • Fatigue.  Objective   Temp:  [97.3 °F (36.3 °C)-98.4 °F (36.9 °C)] 98.1 °F (36.7 °C)  Heart Rate:  [76-98] 81  Resp:  [16-18] 17  BP: (117-163)/(61-79) 143/78  PHYSICAL EXAM   • CON: WN. WD. NAD.  Elderly and chronically ill-appearing.  Hard of hearing.  • EYES:  Sclera anicteric. EOMI. Normal conjunctiva.  Pale.  • ENT:  Oropharyngeal mucosa without ulcers or thrush.    • NECK:  No thyromegaly. No stridor. Trachea midline.  • RESP:  CTA. No wheezes. No crackles.  No work of breathing or tachypnea.   • CV:  Rhythm regular. Rate WNL. No murmur noted.  No edema noted.  • GI:  Soft and nontender. Nondistended.  Bowel sounds present.   • EXT: Peripheral pulses intact.  No joint deformities or cyanosis.  • LYMPH:  No lymphedema noted.  No cervical lymphadenopathy.  • PSYCH:  Alert.  Vague historian.  • NEURO:  No unilateral weakness or paresthesia.  • SKIN: No chronic venous stasis changes or varicosities.  No cellulitis    Results from last 7 days   Lab Units  05/18/23  0421 05/17/23  0514 05/16/23  0506 05/15/23  1543   WBC 10*3/mm3 5.88 6.22 6.61 7.20   HEMOGLOBIN g/dL 11.6* 11.7* 11.8* 11.3*   HEMATOCRIT % 34.9* 35.6* 35.8* 33.9*   PLATELETS 10*3/mm3 199 191 200 194     Results from last 7 days   Lab Units 05/18/23  0421 05/17/23  0514 05/16/23  0506 05/15/23  1543   SODIUM mmol/L 141 138 137 137   POTASSIUM mmol/L 4.6 5.2 5.4* 5.1   CO2 mmol/L 20.1* 19.5* 18.1* 21.8*   CHLORIDE mmol/L 104 104 107 103   ANION GAP mmol/L 16.9* 14.5 11.9 12.2   BUN mg/dL 67* 60* 53* 54*   CREATININE mg/dL 6.16* 5.34* 5.02* 5.02*   GLUCOSE mg/dL 101* 120* 153* 111*           RESULTS REVIEWED:  I have personally reviewed the results from the time of this admission to 5/18/2023 11:14 EDT and agree with these findings:  []  Laboratory  []  Microbiology  []  Radiology  []  EKG/Telemetry   []  Cardiology/Vascular   []  Pathology  []  Old records  []  Other:  Assessment / Plan   Assessment:    • Malfunctioning AV fistula (Acute thrombus to mid and distal fistula with thrombosis)  • End-stage renal disease on hemodialysis  • CAD/stents 2021/CABGx30 yrs ago  • DM  • COPD, stable  • Hypothyroidism  • Deafness  • Anemia from CKD     Plan:  • Hemodialysis soon.  Procedure for malfunctioning AV fistula/thrombectomy today.  • Monitored bed  • Nephrology consulted  • Vascular surgeon consulted  • Continue current diuretics: Bumex  • SSI for glucose control  • Additional recommendations pending clinical course    Discussed plan with RN.  DVT prophylaxis:  Medical DVT prophylaxis orders are present.  CODE STATUS:      Level Of Support Discussed With: Patient  Code Status (Patient has no pulse and is not breathing): CPR (Attempt to Resuscitate)  Medical Interventions (Patient has pulse or is breathing): Full Support         Patient independently seen and evaluated, agree with assessment and plan, above documentation reflects plan put forth during bedside rounds.  More than 51% of the time of this patient  encounter was performed by me.     Interval history;  No acute events overnight, patient resting comfortably in bed, awaiting AV fistula thrombectomy     Exam:  GEN: No acute distress  HEENT: Moist mucous membranes  LUNGS: Equal chest rise bilaterally  NEURO: Moving all 4 extremities spontaneously  SKIN: No obvious breakdown     Plan:  Agree with assessment and plan as above  Nephrology consulted, appreciate their recommendations  Vascular surgery consulted, planning on correcting AV fistula today  Monitor electrolytes and replace as needed  Continue Bumex for volume control  Frequent reorientation  Sliding-scale insulin for hyperglycemia  Continue sodium bicarb  Continue metoprolol  CBC, CMP personally reviewed  Possible TDC today thrombectomy unsuccessful  Will need hemodialysis following  Repeat CBC, CMP, mag and Phos in a.m.  Clinical course will dictate further management     Reviewed patients labs and imaging, and discussed with patient and nurse at bedside.      Electronically signed by Hernandez Hylton MD, 05/18/23, 12:37 PM EDT.

## 2023-05-18 NOTE — PROGRESS NOTES
[facility]     Progress Note    Patient Name: Del Cueva  : 1940  MRN: [id number]  Primary Care Physician:  Provider, No Known  Date of admission: 5/15/2023 12:52 PM       Subjective     He is alert and oriented.  Very hard of hearing.  He was unable to have declot of his fistula yesterday.  He is scheduled for declot today with possible TDC placement by interventional radiology.  He received Lokelma yesterday.  Sadly and unfortunately patient has been sitting here for 3 days for simple declot  Review of Systems:    Review of Systems   Constitutional: Positive for fatigue. Negative for activity change, appetite change and fever.   HENT: Positive for hearing loss.    All other systems reviewed and are negative.          Objective   Objective     Vitals:   Vitals:    23 2305 23 0320 23 0722 23 0900   BP: 159/78 139/74 143/78    BP Location: Right arm Right arm Right arm    Patient Position: Lying Lying Lying    Pulse: 81 81 84 81   Resp: 16 16 17    Temp: 98.1 °F (36.7 °C) 97.3 °F (36.3 °C) 98.1 °F (36.7 °C)    TempSrc: Oral Oral Oral    SpO2: 95% 92% 93% 93%   Weight:       Height:              Physical Exam:  Vitals and nursing note reviewed.     Constitutional:      Alert, cooperative, responsive, and conversant.  No acute distress.     HENT:      Normocephalic and atraumatic, no nasal congestion, discharge, mucous membranes are moist, no OP erythema  Eyes:      PERRLA, no scleral icterus, no conjunctival injection, no eye discharge  Neck:     Supple, no thyromegaly, no lymphadenopathy, trachea midline, no elevated JVD  Cardiovascular:      RRR, no murmurs, rubs or gallops. Palpable pedal pulses bilaterally. No BLE edema.   Pulmonary:      CTAB. Pulmonary effort is normal and unlabored. No respiratory distress.    Abdominal:      Bowel sounds active, soft, nontender, non distended, no organomegaly  Musculoskeletal:         No muscle wasting, tenderness or joint swelling.   No weakness.  Skin:     Warm and dry, cap refill less than 3 seconds. No clubbing, cyanosis, jaundice, erythema, rashes or ecchymosis.   Neurological:      AAOx3, speech clear, no focal deficit, strength equal bilaterally in all extremities, cranial nerves grossly intact  Psychiatric:         Mood and affect normal.  Behavior normal.         Result Review    Result Review:  I have personally reviewed the results from the time of this admission to 9/20/2021 18:13 EDT and agree with these findings:  []  Laboratory  []  Microbiology  []  Radiology  []  EKG/Telemetry   []  Cardiology/Vascular   []  Pathology  []  Old records  []  Other:     Assessment/Plan   Assessment / Plan       Active Hospital Problems:  Active Hospital Problems    Diagnosis    • **Malfunction of arteriovenous dialysis fistula, initial encounter    • Hyperkalemia    • Deafness    • History of anemia due to chronic kidney disease    • End stage renal disease on dialysis          Plan:   Get patient out of bed to chair.  Hemodialysis today after procedure.  I hope and wish patient will have a declot today of AV fistula rather than tunneled dialysis catheter placement which is not the preferred strategy at all  I have personally reviewed the information  Electronically signed by CONCHITA Yip, 05/18/23, 9:32 AM EDT.

## 2023-05-18 NOTE — PLAN OF CARE
Goal Outcome Evaluation:  Plan of Care Reviewed With: patient        Progress: no change  Outcome Evaluation: VSS. Patient rested throughout the night. Has been NPO since midnight for vascular surgery today around 3PM per MD. Call light within reach

## 2023-05-18 NOTE — PLAN OF CARE
Goal Outcome Evaluation:  Plan of Care Reviewed With: patient, daughter   No acute events this shift.  Status post TDC placement and currently receiving HD in dialysis.  VSS.     Progress: improving

## 2023-05-19 ENCOUNTER — READMISSION MANAGEMENT (OUTPATIENT)
Dept: CALL CENTER | Facility: HOSPITAL | Age: 83
End: 2023-05-19
Payer: MEDICARE

## 2023-05-19 VITALS
WEIGHT: 198.63 LBS | DIASTOLIC BLOOD PRESSURE: 64 MMHG | TEMPERATURE: 97.5 F | OXYGEN SATURATION: 95 % | BODY MASS INDEX: 29.42 KG/M2 | HEIGHT: 69 IN | RESPIRATION RATE: 16 BRPM | HEART RATE: 86 BPM | SYSTOLIC BLOOD PRESSURE: 132 MMHG

## 2023-05-19 LAB
ALBUMIN SERPL-MCNC: 3.6 G/DL (ref 3.5–5.2)
ALBUMIN/GLOB SERPL: 1.1 G/DL
ALP SERPL-CCNC: 101 U/L (ref 39–117)
ALT SERPL W P-5'-P-CCNC: 10 U/L (ref 1–41)
ANION GAP SERPL CALCULATED.3IONS-SCNC: 15.8 MMOL/L (ref 5–15)
AST SERPL-CCNC: 17 U/L (ref 1–40)
BASOPHILS # BLD AUTO: 0.07 10*3/MM3 (ref 0–0.2)
BASOPHILS NFR BLD AUTO: 1 % (ref 0–1.5)
BILIRUB SERPL-MCNC: 0.6 MG/DL (ref 0–1.2)
BUN SERPL-MCNC: 21 MG/DL (ref 8–23)
BUN/CREAT SERPL: 6.8 (ref 7–25)
CALCIUM SPEC-SCNC: 9 MG/DL (ref 8.6–10.5)
CHLORIDE SERPL-SCNC: 99 MMOL/L (ref 98–107)
CO2 SERPL-SCNC: 21.2 MMOL/L (ref 22–29)
CREAT SERPL-MCNC: 3.11 MG/DL (ref 0.76–1.27)
DEPRECATED RDW RBC AUTO: 53.2 FL (ref 37–54)
EGFRCR SERPLBLD CKD-EPI 2021: 19.4 ML/MIN/1.73
EOSINOPHIL # BLD AUTO: 0.1 10*3/MM3 (ref 0–0.4)
EOSINOPHIL NFR BLD AUTO: 1.4 % (ref 0.3–6.2)
ERYTHROCYTE [DISTWIDTH] IN BLOOD BY AUTOMATED COUNT: 15.9 % (ref 12.3–15.4)
GLOBULIN UR ELPH-MCNC: 3.3 GM/DL
GLUCOSE BLDC GLUCOMTR-MCNC: 135 MG/DL (ref 70–99)
GLUCOSE BLDC GLUCOMTR-MCNC: 79 MG/DL (ref 70–99)
GLUCOSE SERPL-MCNC: 78 MG/DL (ref 65–99)
HCT VFR BLD AUTO: 35.3 % (ref 37.5–51)
HGB BLD-MCNC: 12 G/DL (ref 13–17.7)
IMM GRANULOCYTES # BLD AUTO: 0.05 10*3/MM3 (ref 0–0.05)
IMM GRANULOCYTES NFR BLD AUTO: 0.7 % (ref 0–0.5)
LYMPHOCYTES # BLD AUTO: 0.54 10*3/MM3 (ref 0.7–3.1)
LYMPHOCYTES NFR BLD AUTO: 7.4 % (ref 19.6–45.3)
MAGNESIUM SERPL-MCNC: 1.6 MG/DL (ref 1.6–2.4)
MCH RBC QN AUTO: 31.7 PG (ref 26.6–33)
MCHC RBC AUTO-ENTMCNC: 34 G/DL (ref 31.5–35.7)
MCV RBC AUTO: 93.4 FL (ref 79–97)
MONOCYTES # BLD AUTO: 0.58 10*3/MM3 (ref 0.1–0.9)
MONOCYTES NFR BLD AUTO: 8 % (ref 5–12)
NEUTROPHILS NFR BLD AUTO: 5.93 10*3/MM3 (ref 1.7–7)
NEUTROPHILS NFR BLD AUTO: 81.5 % (ref 42.7–76)
NRBC BLD AUTO-RTO: 0 /100 WBC (ref 0–0.2)
PHOSPHATE SERPL-MCNC: 4.6 MG/DL (ref 2.5–4.5)
PLATELET # BLD AUTO: 189 10*3/MM3 (ref 140–450)
PMV BLD AUTO: 10.5 FL (ref 6–12)
POTASSIUM SERPL-SCNC: 4 MMOL/L (ref 3.5–5.2)
PROT SERPL-MCNC: 6.9 G/DL (ref 6–8.5)
RBC # BLD AUTO: 3.78 10*6/MM3 (ref 4.14–5.8)
SODIUM SERPL-SCNC: 136 MMOL/L (ref 136–145)
WBC NRBC COR # BLD: 7.27 10*3/MM3 (ref 3.4–10.8)

## 2023-05-19 PROCEDURE — 25010000002 HEPARIN (PORCINE) PER 1000 UNITS: Performed by: INTERNAL MEDICINE

## 2023-05-19 PROCEDURE — 84100 ASSAY OF PHOSPHORUS: CPT | Performed by: PHYSICIAN ASSISTANT

## 2023-05-19 PROCEDURE — 82948 REAGENT STRIP/BLOOD GLUCOSE: CPT

## 2023-05-19 PROCEDURE — 85025 COMPLETE CBC W/AUTO DIFF WBC: CPT | Performed by: PHYSICIAN ASSISTANT

## 2023-05-19 PROCEDURE — 83735 ASSAY OF MAGNESIUM: CPT | Performed by: PHYSICIAN ASSISTANT

## 2023-05-19 PROCEDURE — 99239 HOSP IP/OBS DSCHRG MGMT >30: CPT | Performed by: INTERNAL MEDICINE

## 2023-05-19 PROCEDURE — 80053 COMPREHEN METABOLIC PANEL: CPT | Performed by: PHYSICIAN ASSISTANT

## 2023-05-19 PROCEDURE — 94799 UNLISTED PULMONARY SVC/PX: CPT

## 2023-05-19 RX ORDER — ASPIRIN 81 MG/1
81 TABLET, CHEWABLE ORAL DAILY
Status: DISCONTINUED | OUTPATIENT
Start: 2023-05-19 | End: 2023-05-19 | Stop reason: HOSPADM

## 2023-05-19 RX ORDER — HEPARIN SODIUM 5000 [USP'U]/ML
5000 INJECTION, SOLUTION INTRAVENOUS; SUBCUTANEOUS EVERY 12 HOURS SCHEDULED
Status: DISCONTINUED | OUTPATIENT
Start: 2023-05-19 | End: 2023-05-19 | Stop reason: HOSPADM

## 2023-05-19 RX ADMIN — LEVOTHYROXINE SODIUM 150 MCG: 150 TABLET ORAL at 07:34

## 2023-05-19 RX ADMIN — SODIUM BICARBONATE 650 MG TABLET 1300 MG: at 09:55

## 2023-05-19 RX ADMIN — Medication 10 ML: at 09:56

## 2023-05-19 RX ADMIN — HEPARIN SODIUM 5000 UNITS: 5000 INJECTION INTRAVENOUS; SUBCUTANEOUS at 10:39

## 2023-05-19 RX ADMIN — BUMETANIDE 2 MG: 1 TABLET ORAL at 09:55

## 2023-05-19 RX ADMIN — CLOPIDOGREL BISULFATE 75 MG: 75 TABLET ORAL at 09:55

## 2023-05-19 RX ADMIN — METOPROLOL TARTRATE 25 MG: 25 TABLET, FILM COATED ORAL at 09:55

## 2023-05-19 RX ADMIN — ASPIRIN 81 MG 81 MG: 81 TABLET ORAL at 10:39

## 2023-05-19 NOTE — PAYOR COMM NOTE
MRN:   [id number]  CSN:   [id number]      AUTHORIZATION REQUEST for EMERGENCY DEPARTMENT ADMISSION        PATIENT INFORMATION  Name:             Del Cueva  MRN#:            [id number]        ADMISSION INFORMATION  CLASS:          Inpatient   DOS:               5/15/2023        ADMISSION DIAGNOSIS AND HOSPITAL PROBLEMS    Problems Addressed this Visit        Other    * (Principal) Malfunction of arteriovenous dialysis fistula, initial encounter - Primary    Relevant Orders    Case request (Completed)    Cardiac Catheterization/Vascular Study    Invasive peripheral vascular study    Invasive peripheral vascular study   Diagnoses       Codes Comments    Malfunction of arteriovenous dialysis fistula, initial encounter    -  Primary ICD-10-CM: T82.590A  ICD-9-CM: 996.1                ADMITTING PROVIDER INFORMATION  Name/NPI       Hernandez Hylton MD [[id number]]  Phone:            Phone: [phone]        RENDERING FACILITY  Name:             [facility]   NPI:                 [id number]  TID:                 [id number]  Address:        [address] [place] [address] [place] [address]        CASE MANAGEMENT CONTACT INFORMATION  Name:             STEPHANI Vargas  Phone:            [phone]  Fax:                [phone]  Del Cueva (82 y.o. Male)     Date of Birth   1940    Social Security Number       Address   [address] [place] [address]    Home Phone   [phone]    MRN   [id number]       Yazidi   Sikhism    Marital Status                               Admission Date   5/15/23    Admission Type   Emergency    Admitting Provider   Maicol Becker MD    Attending Provider   Hernandez Hylton MD    Department, Room/Bed   [facility] PROGRESSIVE CARE UNIT, 210/1       Discharge Date       Discharge Disposition       Discharge Destination                               Attending Provider: Hernandez Hylton MD    Allergies: No Known  "Allergies    Isolation: None   Infection: None   Code Status: CPR    Ht: 175.3 cm (69.02\")   Wt: 90.1 kg (198 lb 10.2 oz)    Admission Cmt: None   Principal Problem: Malfunction of arteriovenous dialysis fistula, initial encounter [T82.590A]                 Active Insurance as of 5/15/2023     Primary Coverage     Payor Plan Insurance Group Employer/Plan Group    ANTHEM MEDICARE REPLACEMENT ANTHEM MEDICARE ADVANTAGE [id number]     Payor Plan Address Payor Plan Phone Number Payor Plan Fax Number Effective Dates    [address] [phone]  2019 - None Entered    [facility] [id number]       Subscriber Name Subscriber Birth Date Member ID       DEL CUEVA 1940 [id number]                 Emergency Contacts      (Rel.) Home Phone Work Phone Mobile Phone    ENRIQUE GAINES (Daughter) [phone] -- [phone]               Byron Kennedy MD   Physician  Vascular Surgery  Progress Notes     Signed  Date of Service:  23  Creation Time:  23     Signed          Unable to perform declot of patient's fistula yesterday due to emergent STEMI requiring cardiac cath with intervention yesterday.  I have discussed patient with Dr. Eitan whitmore IR who is here today and he is willing to attempt declot of patient's L BBAVF with possible TDC.  Patient and his daughter update yesterday evening as to the reason for delay and were understanding.  Appreciate IR's care.                  Cee Mcmahon MD   Physician  Nephrology  Progress Notes     Signed  Date of Service:  23  Creation Time:  23     Signed        Expand All [facility] All     [facility]     Progress Note     Patient Name: Del Cueva  : 1940  MRN: [id number]  Primary Care Physician:  Provider, No Known  Date of admission: 5/15/2023 12:52 PM         Subjective      He is alert and oriented.  Very hard of hearing.  He was unable to have declot of his fistula yesterday.  He is scheduled " for declot today with possible TDC placement by interventional radiology.  He received Lokelma yesterday.  Sadly and unfortunately patient has been sitting here for 3 days for simple declot  Review of Systems:     Review of Systems   Constitutional: Positive for fatigue. Negative for activity change, appetite change and fever.   HENT: Positive for hearing loss.    All other systems reviewed and are negative.           Objective   Objective      Vitals:   Vitals          Vitals:     05/17/23 2305 05/18/23 0320 05/18/23 0722 05/18/23 0900   BP: 159/78 139/74 143/78     BP Location: Right arm Right arm Right arm     Patient Position: Lying Lying Lying     Pulse: 81 81 84 81   Resp: 16 16 17     Temp: 98.1 °F (36.7 °C) 97.3 °F (36.3 °C) 98.1 °F (36.7 °C)     TempSrc: Oral Oral Oral     SpO2: 95% 92% 93% 93%   Weight:           Height:                     Physical Exam:  Vitals and nursing note reviewed.      Constitutional:      Alert, cooperative, responsive, and conversant.  No acute distress.                        HENT:      Normocephalic and atraumatic, no nasal congestion, discharge, mucous membranes are moist, no OP erythema  Eyes:      PERRLA, no scleral icterus, no conjunctival injection, no eye discharge  Neck:     Supple, no thyromegaly, no lymphadenopathy, trachea midline, no elevated JVD  Cardiovascular:      RRR, no murmurs, rubs or gallops. Palpable pedal pulses bilaterally. No BLE edema.   Pulmonary:      CTAB. Pulmonary effort is normal and unlabored. No respiratory distress.    Abdominal:      Bowel sounds active, soft, nontender, non distended, no organomegaly  Musculoskeletal:         No muscle wasting, tenderness or joint swelling.  No weakness.  Skin:     Warm and dry, cap refill less than 3 seconds. No clubbing, cyanosis, jaundice, erythema, rashes or ecchymosis.   Neurological:      AAOx3, speech clear, no focal deficit, strength equal bilaterally in all extremities, cranial nerves grossly  intact  Psychiatric:         Mood and affect normal.  Behavior normal.          Result Review    Result Review:  I have personally reviewed the results from the time of this admission to 2021 18:13 EDT and agree with these findings:  []?  Laboratory  []?  Microbiology  []?  Radiology  []?  EKG/Telemetry   []?  Cardiology/Vascular   []?  Pathology  []?  Old records  []?  Other:     Assessment/Plan   Assessment / Plan         Active Hospital Problems:       Active Hospital Problems     Diagnosis     • **Malfunction of arteriovenous dialysis fistula, initial encounter     • Hyperkalemia     • Deafness     • History of anemia due to chronic kidney disease     • End stage renal disease on dialysis              Plan:   Get patient out of bed to chair.  Hemodialysis today after procedure.  I hope and wish patient will have a declot today of AV fistula rather than tunneled dialysis catheter placement which is not the preferred strategy at all  I have personally reviewed the information  Electronically signed by CONCHITA Yip, 23, 9:32 AM EDT.              Revision History                                    Cee Mcmahon MD   Physician  Nephrology  Progress Notes     Signed  Date of Service:  23  Creation Time:  23     Signed        Expand All [facility] All     [facility]     Progress Note     Patient Name: Del Cueva  : 1940  MRN: [id number]  Primary Care Physician:  Provider, No Known  Date of admission: 5/15/2023 12:52 PM         Subjective      He is alert and oriented.  Very hard of hearing.  He was unable to have declot of his fistula yesterday.  He is scheduled for declot today with possible TDC placement by interventional radiology.  He received Lokelma yesterday.  Sadly and unfortunately patient has been sitting here for 3 days for simple declot  Review of Systems:     Review of Systems   Constitutional: Positive for fatigue. Negative for activity  change, appetite change and fever.   HENT: Positive for hearing loss.    All other systems reviewed and are negative.           Objective   Objective      Vitals:   Vitals          Vitals:     05/17/23 2305 05/18/23 0320 05/18/23 0722 05/18/23 0900   BP: 159/78 139/74 143/78     BP Location: Right arm Right arm Right arm     Patient Position: Lying Lying Lying     Pulse: 81 81 84 81   Resp: 16 16 17     Temp: 98.1 °F (36.7 °C) 97.3 °F (36.3 °C) 98.1 °F (36.7 °C)     TempSrc: Oral Oral Oral     SpO2: 95% 92% 93% 93%   Weight:           Height:                     Physical Exam:  Vitals and nursing note reviewed.      Constitutional:      Alert, cooperative, responsive, and conversant.  No acute distress.                        HENT:      Normocephalic and atraumatic, no nasal congestion, discharge, mucous membranes are moist, no OP erythema  Eyes:      PERRLA, no scleral icterus, no conjunctival injection, no eye discharge  Neck:     Supple, no thyromegaly, no lymphadenopathy, trachea midline, no elevated JVD  Cardiovascular:      RRR, no murmurs, rubs or gallops. Palpable pedal pulses bilaterally. No BLE edema.   Pulmonary:      CTAB. Pulmonary effort is normal and unlabored. No respiratory distress.    Abdominal:      Bowel sounds active, soft, nontender, non distended, no organomegaly  Musculoskeletal:         No muscle wasting, tenderness or joint swelling.  No weakness.  Skin:     Warm and dry, cap refill less than 3 seconds. No clubbing, cyanosis, jaundice, erythema, rashes or ecchymosis.   Neurological:      AAOx3, speech clear, no focal deficit, strength equal bilaterally in all extremities, cranial nerves grossly intact  Psychiatric:         Mood and affect normal.  Behavior normal.          Result Review    Result Review:  I have personally reviewed the results from the time of this admission to 9/20/2021 18:13 EDT and agree with these findings:  []?  Laboratory  []?  Microbiology  []?  Radiology  []?   EKG/Telemetry   []?  Cardiology/Vascular   []?  Pathology  []?  Old records  []?  Other:     Assessment/Plan   Assessment / Plan         Active Hospital Problems:       Active Hospital Problems     Diagnosis     • **Malfunction of arteriovenous dialysis fistula, initial encounter     • Hyperkalemia     • Deafness     • History of anemia due to chronic kidney disease     • End stage renal disease on dialysis              Plan:   Get patient out of bed to chair.  Hemodialysis today after procedure.  I hope and wish patient will have a declot today of AV fistula rather than tunneled dialysis catheter placement which is not the preferred strategy at all  I have personally reviewed the information  Electronically signed by CONCHITA Yip, 23, 9:32 AM EDT.              Revision History                                  Hernandez Hylton MD   Physician  Hospitalist  Progress Notes     Signed  Date of Service:  23  Creation Time:  23     Signed           [facility]   Hospitalist Progress Note         Patient Name: Del Cueva  : 1940  MRN: [id number]  Primary Care Physician: Provider, No Known  Date of admission: 5/15/2023  Today's Date: 2023  Room / Bed:   210/1  Subjective   Chief Complaint: Malfunctioning AV fistula     Summary:  Del Cueva is a 82 y.o. male past medical history of end-stage renal disease on dialysis, COPD, coronary disease with MI and 3 stents placed, type 2 diabetes, hypothyroidism, and hypertension who came in after failing to get dialysis secondary to a malfunctioning AV fistula     Interval Followup: 2023     • No new complaints or overnight issues.  Patient resting in bed.  • Procedure planned for today regarding malfunctioning AV fistula  • Patient alert.  Resting in bed.  Hard of hearing.  Poor historian in general.  • Normal sinus rhythm with occasional PACs   • No signs stable  • K 4.6        REVIEW OF SYSTEMS: All other  systems reviewed and are negative.   • Fatigue.  Objective   Temp:  [97.3 °F (36.3 °C)-98.4 °F (36.9 °C)] 98.1 °F (36.7 °C)  Heart Rate:  [76-98] 81  Resp:  [16-18] 17  BP: (117-163)/(61-79) 143/78  PHYSICAL EXAM        • CON:   WN. WD. NAD.  Elderly and chronically ill-appearing.  Hard of hearing.  • EYES:  Sclera anicteric. EOMI. Normal conjunctiva.  Pale.  • ENT:    Oropharyngeal mucosa without ulcers or thrush.    • NECK:             No thyromegaly. No stridor. Trachea midline.  • RESP:             CTA. No wheezes. No crackles.  No work of breathing or tachypnea.   • CV:      Rhythm regular. Rate WNL. No murmur noted.  No edema noted.  • GI:                   Soft and nontender. Nondistended.  Bowel sounds present.   • EXT:    Peripheral pulses intact.  No joint deformities or cyanosis.  • LYMPH:           No lymphedema noted.  No cervical lymphadenopathy.  • PSYCH:           Alert.  Vague historian.  • NEURO:          No unilateral weakness or paresthesia.  • SKIN:   No chronic venous stasis changes or varicosities.  No cellulitis             Results from last 7 days   Lab Units 05/18/23  0421 05/17/23  0514 05/16/23  0506 05/15/23  1543   WBC 10*3/mm3 5.88 6.22 6.61 7.20   HEMOGLOBIN g/dL 11.6* 11.7* 11.8* 11.3*   HEMATOCRIT % 34.9* 35.6* 35.8* 33.9*   PLATELETS 10*3/mm3 199 191 200 194              Results from last 7 days   Lab Units 05/18/23  0421 05/17/23  0514 05/16/23  0506 05/15/23  1543   SODIUM mmol/L 141 138 137 137   POTASSIUM mmol/L 4.6 5.2 5.4* 5.1   CO2 mmol/L 20.1* 19.5* 18.1* 21.8*   CHLORIDE mmol/L 104 104 107 103   ANION GAP mmol/L 16.9* 14.5 11.9 12.2   BUN mg/dL 67* 60* 53* 54*   CREATININE mg/dL 6.16* 5.34* 5.02* 5.02*   GLUCOSE mg/dL 101* 120* 153* 111*             RESULTS REVIEWED:  I have personally reviewed the results from the time of this admission to 5/18/2023 11:14 EDT and agree with these findings:  []?  Laboratory  []?  Microbiology  []?  Radiology  []?  EKG/Telemetry   []?   Cardiology/Vascular   []?  Pathology  []?  Old records  []?  Other:  Assessment / Plan   Assessment:     • Malfunctioning AV fistula (Acute thrombus to mid and distal fistula with thrombosis)  • End-stage renal disease on hemodialysis  • CAD/stents 2021/CABGx30 yrs ago  • DM  • COPD, stable  • Hypothyroidism  • Deafness  • Anemia from CKD     Plan:  • Hemodialysis soon.  Procedure for malfunctioning AV fistula/thrombectomy today.  • Monitored bed  • Nephrology consulted  • Vascular surgeon consulted  • Continue current diuretics: Bumex  • SSI for glucose control  • Additional recommendations pending clinical course     Discussed plan with RN.  DVT prophylaxis:  Medical DVT prophylaxis orders are present.  CODE STATUS:      Level Of Support Discussed With: Patient  Code Status (Patient has no pulse and is not breathing): CPR (Attempt to Resuscitate)  Medical Interventions (Patient has pulse or is breathing): Full Support            Patient independently seen and evaluated, agree with assessment and plan, above documentation reflects plan put forth during bedside rounds.  More than 51% of the time of this patient encounter was performed by me.     Interval history;  No acute events overnight, patient resting comfortably in bed, awaiting AV fistula thrombectomy     Exam:  GEN: No acute distress  HEENT: Moist mucous membranes  LUNGS: Equal chest rise bilaterally  NEURO: Moving all 4 extremities spontaneously  SKIN: No obvious breakdown     Plan:  Agree with assessment and plan as above  Nephrology consulted, appreciate their recommendations  Vascular surgery consulted, planning on correcting AV fistula today  Monitor electrolytes and replace as needed  Continue Bumex for volume control  Frequent reorientation  Sliding-scale insulin for hyperglycemia  Continue sodium bicarb  Continue metoprolol  CBC, CMP personally reviewed  Possible TDC today thrombectomy unsuccessful  Will need hemodialysis following  Repeat CBC, CMP,   and Phos in a.m.  Clinical course will dictate further management     Reviewed patients labs and imaging, and discussed with patient and nurse at bedside.       Electronically signed by Hernandez Hylton MD, 23, 12:37 PM EDT.                             Revision History                               Elizabeth Garcia, RN   Registered Nurse  Nursing  Plan of Care     Signed  Date of Service:  23  Creation Time:  23     Signed          Goal Outcome Evaluation:  Plan of Care Reviewed With: patient, daughter   No acute events this shift.  Status post TDC placement and currently receiving HD in dialysis.  VSS.  Progress: improving                          Cee Mcmahon MD   Physician  Nephrology  Progress Notes     Addendum  Date of Service:  23  Creation Time:  23     Expand All Collapse All     [facility]     Progress Note     Patient Name: Del Cueva  : 1940  MRN: [id number]  Primary Care Physician:  Provider, No Known  Date of admission: 5/15/2023        Subjective []Expand by Default    Interventional radiology is tried thrombectomy but it was unsuccessful and procedure was aborted and had tunneled dialysis catheter in the right IJ     Review of Systems  Constitutional:           Weakness tiredness fatigue  Eyes:                          No blurry vision, eye discharge, eye irritation, eye pain  HEENT:                       No acute hair loss, earache and discharge, nasal congestion or discharge, sore throat, postnasal drip  Respiratory:               No shortness of breath coughing sputum production wheezing hemoptysis pleuritic chest pain  Cardiovascular:         No chest pain, orthopnea, PND, dizziness, palpitation, lower extremity edema  Gastrointestinal:       No nausea vomiting diarrhea abdominal pain constipation  Genitourinary:           No urinary incontinence, hesitancy, frequency, urgency, dysuria  Hematologic:             No  bruising, bleeding, pallor, lymphadenopathy  Endocrine:                 No coldness, hot flashes, polyuria, abnormal hair growth  Musculoskeletal:       No body pains, aches, arthritic pains, muscle pain ,muscle wasting  Psychiatric:               No low or high mood, anxiety, hallucinations, delusions  Skin.                           No rash, ulcers, bruising, itching  Neurological:             No confusion, headache, focal weakness, numbness, dysphasia           Objective      Objective      Vitals:   Temp:  [97.5 °F (36.4 °C)-97.9 °F (36.6 °C)] 97.5 °F (36.4 °C)  Heart Rate:  [86-89] 86  Resp:  [15-18] 16  BP: (132-173)/(64-84) 132/64  Flow (L/min):  [2] 2  Physical Exam                         Constitutional:           Awake, alert responsive, conversant, no obvious distress              Psychiatric:               Appropriate affect, cooperative              Neurologic:                Awake alert ,oriented x 3, strength symmetric in all extremities, Cranial Nerves grossly intact to confrontation, speech clear              Eyes:                           PERRLA, sclerae anicteric, no conjunctival injection              HEENT:                       Moist mucous membranes, no nasal or eye discharge, no throat congestion              Neck:                          Supple, no thyromegaly, no lymphadenopathy, trachea midline, no elevated JVD              Respiratory:               Clear to auscultation bilaterally, nonlabored respirations               Cardiovascular:         RRR, no murmurs, rubs, or gallops, palpable pedal pulses bilaterally, No bilateral ankle edema              Gastrointestinal:       Positive bowel sounds, soft, nontender, nondistended, no organomegaly              Musculoskeletal:       No clubbing or cyanosis to extremities,muscle wasting, joint swelling, muscle weakness             Skin:                            No rashes, bruising, skin ulcers, petechiae or ecchymosis           Result  Review       Result Review:  I have personally reviewed the results from the time of this admission to 5/19/2023 11:24 EDT and agree with these findings:  []?  Laboratory  []?  Microbiology  []?  Radiology  []?  EKG/Telemetry   []?  Cardiology/Vascular   []?  Pathology  []?  Old records  []?  Other:           Assessment & Plan     Assessment / Plan         Active Hospital Problems:            Active Hospital Problems     Diagnosis   POA   • **Malfunction of arteriovenous dialysis fistula, initial encounter [T82.590A]   Yes   • Hyperkalemia [E87.5]   Unknown   • Deafness [H91.90]   Unknown   • History of anemia due to chronic kidney disease [N18.9, Z86.2]   Not Applicable   • End stage renal disease on dialysis [N18.6, Z99.2]   Not Applicable       Added automatically from request for surgery [id number]            Plan:   Patient is clinically doing okay and has no new issues  DC plan per primary team  Electronically signed by Cee Mcmahon MD, 05/19/23, 11:24 AM EDT.                          Revision History

## 2023-05-19 NOTE — PROGRESS NOTES
[facility]   Hospitalist Progress Note       Patient Name: Del Cueva  : 1940  MRN: [id number]  Primary Care Physician: Provider, No Known  Date of admission: 5/15/2023  Today's Date: 2023  Room / Bed:   210/1  Subjective   Chief Complaint: Malfunctioning AV fistula    Summary:  Del Cueva is a 82 y.o. male past medical history of end-stage renal disease on dialysis, COPD, coronary disease with MI and 3 stents placed, type 2 diabetes, hypothyroidism, and hypertension who came in after failing to get dialysis secondary to a malfunctioning AV fistula    Interval Followup: 2023    • No new complaints or overnight issues.  Patient resting in bed.  • Had right IJ TDC placed yesterday.  • Had HD yesterday  • Vital signs stable  • Patient eager to return home  • Cleared to return home per nephrology  • Has HD set up for Monday,  as usual      REVIEW OF SYSTEMS: All other systems reviewed and are negative.   • Fatigue.  Objective   Temp:  [97.5 °F (36.4 °C)-97.9 °F (36.6 °C)] 97.5 °F (36.4 °C)  Heart Rate:  [86-89] 86  Resp:  [15-18] 16  BP: (132-173)/(64-84) 132/64  PHYSICAL EXAM   • CON: WN. WD. NAD.  Elderly and chronically ill-appearing.  Hard of hearing.  • EYES:  Sclera anicteric. EOMI. Normal conjunctiva.  Pale.  • ENT:  Oropharyngeal mucosa without ulcers or thrush.    • NECK:  No thyromegaly. No stridor. Trachea midline.  • RESP:  CTA. No wheezes. No crackles.  No work of breathing or tachypnea.   • CV:  Rhythm regular. Rate WNL. No murmur noted.  No edema noted.  • GI:  Soft and nontender. Nondistended.  Bowel sounds present.   • EXT: Peripheral pulses intact.  No joint deformities or cyanosis.  • LYMPH:  No lymphedema noted.  No cervical lymphadenopathy.  • PSYCH:  Alert.  Vague historian.  • NEURO:  No unilateral weakness or paresthesia.  • SKIN: No chronic venous stasis changes or varicosities.  No cellulitis    Results from last 7 days   Lab Units 23  9222  05/18/23  0421 05/17/23  0514 05/16/23  0506 05/15/23  1543   WBC 10*3/mm3 7.27 5.88 6.22 6.61 7.20   HEMOGLOBIN g/dL 12.0* 11.6* 11.7* 11.8* 11.3*   HEMATOCRIT % 35.3* 34.9* 35.6* 35.8* 33.9*   PLATELETS 10*3/mm3 189 199 191 200 194     Results from last 7 days   Lab Units 05/19/23  0419 05/18/23  0421 05/17/23  0514 05/16/23  0506 05/15/23  1543   SODIUM mmol/L 136 141 138 137 137   POTASSIUM mmol/L 4.0 4.6 5.2 5.4* 5.1   CO2 mmol/L 21.2* 20.1* 19.5* 18.1* 21.8*   CHLORIDE mmol/L 99 104 104 107 103   ANION GAP mmol/L 15.8* 16.9* 14.5 11.9 12.2   BUN mg/dL 21 67* 60* 53* 54*   CREATININE mg/dL 3.11* 6.16* 5.34* 5.02* 5.02*   GLUCOSE mg/dL 78 101* 120* 153* 111*           RESULTS REVIEWED:  I have personally reviewed the results from the time of this admission to 5/19/2023 13:37 EDT and agree with these findings:  []  Laboratory  []  Microbiology  []  Radiology  []  EKG/Telemetry   []  Cardiology/Vascular   []  Pathology  []  Old records  []  Other:  Assessment / Plan   Assessment:    • Malfunctioning AV fistula (Acute thrombus to mid and distal fistula with thrombosis)  • End-stage renal disease on hemodialysis  • CAD/stents 2021/CABGx30 yrs ago  • DM  • COPD, stable  • Hypothyroidism  • Deafness  • Anemia from CKD     Plan:  • Okay for discharge per nephrology.  • Home today.  Resume normal HD schedule Monday, Friday.  • Hemodialysis last night via right IJ TDC.       Discussed plan with RN.  DVT prophylaxis:  Medical DVT prophylaxis orders are present.  CODE STATUS:      Level Of Support Discussed With: Patient  Code Status (Patient has no pulse and is not breathing): CPR (Attempt to Resuscitate)  Medical Interventions (Patient has pulse or is breathing): Full Support

## 2023-05-19 NOTE — PLAN OF CARE
Goal Outcome Evaluation:  Plan of Care Reviewed With: patient        Progress: no change. Rested well.

## 2023-05-19 NOTE — PROGRESS NOTES
[facility]     Progress Note    Patient Name: Del Cueva  : 1940  MRN: [id number]  Primary Care Physician:  Provider, No Known  Date of admission: 5/15/2023    Subjective  Interventional radiology is tried thrombectomy but it was unsuccessful and procedure was aborted and had tunneled dialysis catheter in the right IJ    Review of Systems  Constitutional:        Weakness tiredness fatigue  Eyes:                       No blurry vision, eye discharge, eye irritation, eye pain  HEENT:                   No acute hair loss, earache and discharge, nasal congestion or discharge, sore throat, postnasal drip  Respiratory:           No shortness of breath coughing sputum production wheezing hemoptysis pleuritic chest pain  Cardiovascular:     No chest pain, orthopnea, PND, dizziness, palpitation, lower extremity edema  Gastrointestinal:   No nausea vomiting diarrhea abdominal pain constipation  Genitourinary:       No urinary incontinence, hesitancy, frequency, urgency, dysuria  Hematologic:         No bruising, bleeding, pallor, lymphadenopathy  Endocrine:            No coldness, hot flashes, polyuria, abnormal hair growth  Musculoskeletal:    No body pains, aches, arthritic pains, muscle pain ,muscle wasting  Psychiatric:          No low or high mood, anxiety, hallucinations, delusions  Skin.                      No rash, ulcers, bruising, itching  Neurological:        No confusion, headache, focal weakness, numbness, dysphasia    Objective   Objective     Vitals:   Temp:  [97.5 °F (36.4 °C)-97.9 °F (36.6 °C)] 97.5 °F (36.4 °C)  Heart Rate:  [86-89] 86  Resp:  [15-18] 16  BP: (132-173)/(64-84) 132/64  Flow (L/min):  [2] 2  Physical Exam    Constitutional: Awake, alert responsive, conversant, no obvious distress              Psychiatric:  Appropriate affect, cooperative   Neurologic:  Awake alert ,oriented x 3, strength symmetric in all extremities, Cranial Nerves grossly intact to confrontation, speech  clear   Eyes:   PERRLA, sclerae anicteric, no conjunctival injection   HEENT:  Moist mucous membranes, no nasal or eye discharge, no throat congestion   Neck:   Supple, no thyromegaly, no lymphadenopathy, trachea midline, no elevated JVD   Respiratory:  Clear to auscultation bilaterally, nonlabored respirations    Cardiovascular: RRR, no murmurs, rubs, or gallops, palpable pedal pulses bilaterally, No bilateral ankle edema   Gastrointestinal: Positive bowel sounds, soft, nontender, nondistended, no organomegaly   Musculoskeletal:  No clubbing or cyanosis to extremities,muscle wasting, joint swelling, muscle weakness             Skin:                      No rashes, bruising, skin ulcers, petechiae or ecchymosis    Result Review    Result Review:  I have personally reviewed the results from the time of this admission to 5/19/2023 11:24 EDT and agree with these findings:  []  Laboratory  []  Microbiology  []  Radiology  []  EKG/Telemetry   []  Cardiology/Vascular   []  Pathology  []  Old records  []  Other:    Assessment & Plan   Assessment / Plan       Active Hospital Problems:    Active Hospital Problems    Diagnosis  POA   • **Malfunction of arteriovenous dialysis fistula, initial encounter [T82.590A]  Yes   • Hyperkalemia [E87.5]  Unknown   • Deafness [H91.90]  Unknown   • History of anemia due to chronic kidney disease [N18.9, Z86.2]  Not Applicable   • End stage renal disease on dialysis [N18.6, Z99.2]  Not Applicable     Added automatically from request for surgery [id number]         Plan:   Patient is clinically doing okay and has no new issues  DC plan per primary team  Electronically signed by Cee Mcmahon MD, 05/19/23, 11:24 AM EDT.

## 2023-05-19 NOTE — DISCHARGE SUMMARY
[facility]         HOSPITALIST  DISCHARGE SUMMARY    Patient Name: Del Cueva  : 1940  MRN: [id number]    Date of Admission: 5/15/2023  Date of Discharge:  2023  Primary Care Physician: Provider, No Known    Consults     Date and Time Order Name Status Description    5/15/2023  7:41 PM Inpatient Vascular Surgery Consult Completed     5/15/2023  7:41 PM Inpatient Nephrology Consult      5/15/2023  2:51 PM Hospitalist (on-call MD unless specified)      5/15/2023  2:49 PM IP General Consult (Use specialty-specific consult if known)      5/15/2023  2:26 PM IP General Consult (Use specialty-specific consult if known)            Active and Resolved Hospital Problems:  Malfunctioning AV fistula (Acute thrombus to mid and distal fistula with thrombosis)  End-stage renal disease on hemodialysis  CAD/stents /CABGx30 yrs ago  DM  COPD, stable  Hypothyroidism  Deafness  Anemia from CKD    Hospital Course     Hospital Course:  Del Cueva is a 82 y.o. male past medical history of end-stage renal disease on dialysis, COPD, coronary disease with MI and 3 stents placed, type 2 diabetes, hypothyroidism, and hypertension who came in after failing to get dialysis secondary to a malfunctioning AV fistula.  Patient was evaluated by vascular surgery, thrombectomy was scheduled with interventional radiology however procedure was unsuccessful and patient had to have tunneled dialysis catheter placed.  Patient tolerated dialysis through this and he was deemed safe to discharge home.  Patient should continue with his currently scheduled dialysis.  Patient should follow-up with his nephrologist for further evaluation and management of his chronic kidney disease.  Patient is discharged home today in stable condition    Day of Discharge     Vital Signs:  Temp:  [97.5 °F (36.4 °C)-97.9 °F (36.6 °C)] 97.5 °F (36.4 °C)  Heart Rate:  [86-89] 86  Resp:  [15-18] 16  BP: (132-173)/(64-84)  132/64    Physical Exam:   GEN: No acute distress  HEENT: Moist mucous membranes  LUNGS: Equal chest rise bilaterally  NEURO: Moving all 4 extremities spontaneously  SKIN: No obvious breakdown    Discharge Details        Discharge Medications      Continue These Medications      Instructions Start Date   aspirin 81 MG EC tablet   81 mg, Oral, Daily, PER DR ALTAMIRANO/JARRED AT [facility] TO CONTINUE TAKING UP TO DAY OF SURGERY. MESSAGE LEFT FOR JOHNNA NUNEZ      atorvastatin 40 MG tablet  Commonly known as: LIPITOR   40 mg, Oral, Daily      bumetanide 2 MG tablet  Commonly known as: BUMEX   2 mg, Oral, 2 Times Daily      clopidogrel 75 MG tablet  Commonly known as: PLAVIX   75 mg, Oral, Daily, PER DR ALTAMIRANO/JARRED AT [facility] TO CONTINUE TAKING UP TO DAY OF SURGERY. MESSAGE LEFT FOR DAUGHTER ENRIQUE      FeroSul 325 (65 FE) MG tablet  Generic drug: ferrous sulfate   1 tablet, Oral, Daily      glipizide 5 MG tablet  Commonly known as: GLUCOTROL   5 mg, Oral, Daily, INSTRUCTED PER ANESTHESIA PROTOCOL      levothyroxine 150 MCG tablet  Commonly known as: SYNTHROID, LEVOTHROID   150 mcg, Oral, Every Morning Before Breakfast      metoprolol tartrate 25 MG tablet  Commonly known as: LOPRESSOR   25 mg, Oral, 2 Times Daily      nateglinide 60 MG tablet  Commonly known as: STARLIX   60 mg, Oral, 3 Times Daily, INSTRUCTED PER ANESTHESIA PROTOCOL      sodium bicarbonate 650 MG tablet   1,300 mg, Oral, 3 Times Daily      tamsulosin 0.4 MG capsule 24 hr capsule  Commonly known as: FLOMAX   0.4 mg, Oral, Nightly             No Known Allergies    Discharge Disposition:  Home or Self Care    Diet:  Hospital:  Diet Order   Procedures   • Diet: Renal Diets; Low Sodium (2-3g), Low Potassium; Texture: Regular Texture (IDDSI 7); Fluid Consistency: Thin (IDDSI 0)       Discharge Activity:       CODE STATUS:  Code Status and Medical Interventions:   Ordered at: 05/15/23 3681     Level Of Support Discussed With:    Patient      Code Status (Patient has no pulse and is not breathing):    CPR (Attempt to Resuscitate)     Medical Interventions (Patient has pulse or is breathing):    Full Support       No future appointments.    Additional Instructions for the Follow-ups that You Need to Schedule     Discharge Follow-up with Specified Provider: Dr. Mcmahon nephrology; 2 Weeks   As directed      To: Dr. Mcmahon nephrology    Follow Up: 2 Weeks         Discharge Follow-up with Specified Provider: Follow up in dialysis clinic this Monday.   As directed      To: Follow up in dialysis clinic this Monday.               Pertinent  and/or Most Recent Results     IMAGING:  Duplex Hemodialysis Access CAR    Result Date: 5/16/2023  •  Thrombosed left arm AV fistula. •  Patent left brachial artery. •  Patent left axillary and subclavian veins.       LAB RESULTS:      Lab 05/19/23 0419 05/18/23 0421 05/17/23  0514 05/16/23  0506 05/15/23  1543   WBC 7.27 5.88 6.22 6.61 7.20   HEMOGLOBIN 12.0* 11.6* 11.7* 11.8* 11.3*   HEMATOCRIT 35.3* 34.9* 35.6* 35.8* 33.9*   PLATELETS 189 199 191 200 194   NEUTROS ABS 5.93 4.13 4.25 4.89 5.12   IMMATURE GRANS (ABS) 0.05 0.04 0.05 0.05 0.05   LYMPHS ABS 0.54* 0.84 0.92 0.73 0.96   MONOS ABS 0.58 0.57 0.62 0.59 0.66   EOS ABS 0.10 0.23 0.30 0.28 0.33   MCV 93.4 94.6 95.7 97.8* 96.6         Lab 05/19/23 0419 05/18/23 0421 05/17/23  0514 05/16/23  0506 05/15/23  1543   SODIUM 136 141 138 137 137   POTASSIUM 4.0 4.6 5.2 5.4* 5.1   CHLORIDE 99 104 104 107 103   CO2 21.2* 20.1* 19.5* 18.1* 21.8*   ANION GAP 15.8* 16.9* 14.5 11.9 12.2   BUN 21 67* 60* 53* 54*   CREATININE 3.11* 6.16* 5.34* 5.02* 5.02*   EGFR 19.4* 8.5* 10.1* 10.9* 10.9*   GLUCOSE 78 101* 120* 153* 111*   CALCIUM 9.0 8.7 8.7 9.2 9.0   MAGNESIUM 1.6 1.4* 1.3* 1.6  --    PHOSPHORUS 4.6* 5.3* 4.1  --   --          Lab 05/19/23  0419 05/18/23  0421 05/17/23  0514 05/16/23  0506   TOTAL PROTEIN 6.9 6.5 6.6 6.8   ALBUMIN 3.6 3.7 3.7 3.7   GLOBULIN 3.3 2.8 2.9 3.1    ALT (SGPT) 10 9 10 12   AST (SGOT) 17 12 13 14   BILIRUBIN 0.6 0.4 0.5 0.3   ALK PHOS 101 98 107 121*                 Lab 05/17/23  0514   ABO TYPING O   RH TYPING Positive   ANTIBODY SCREEN Negative         Brief Urine Lab Results  (Last result in the past 365 days)      Color   Clarity   Blood   Leuk Est   Nitrite   Protein   CREAT   Urine HCG        05/15/23 1511 Yellow   Clear   Negative   Negative   Negative   100 mg/dL (2+)               Microbiology Results (last 10 days)     ** No results found for the last 240 hours. **        Results for orders placed during the hospital encounter of 05/15/23    Duplex Hemodialysis Access CAR    Interpretation Summary  •  Thrombosed left arm AV fistula.  •  Patent left brachial artery.  •  Patent left axillary and subclavian veins.    Results for orders placed during the hospital encounter of 05/15/23    Duplex Hemodialysis Access CAR    Interpretation Summary  •  Thrombosed left arm AV fistula.  •  Patent left brachial artery.  •  Patent left axillary and subclavian veins.        Time spent on Discharge including face to face service: Greater than 30 minutes      Electronically signed by Hernandez Hylton MD, 05/19/23, 4:47 PM EDT.

## 2023-05-19 NOTE — NURSING NOTE
Hemodialysis complete, unable to achieve ordered fluid goal d/t hypotension (please see aces notes). Boluses given to maintain adequate bp. 514 ml positive fluid balance. Dr. Mcmahon notified. Last bp 148/69 hr 88.

## 2023-05-20 NOTE — OUTREACH NOTE
Prep Survey    Flowsheet Row Responses   [facility] facility patient discharged from? [facility]   Is LACE score < 7 ? No   Eligibility Readm Mgmt   Discharge diagnosis Malfunctioning AV fistula (Acute thrombus to mid and distal fistula with thrombosis)   Does the patient have one of the following disease processes/diagnoses(primary or secondary)? Other   Does the patient have Home health ordered? No   Is there a DME ordered? No   Prep survey completed? Yes          Christine CALI - Registered Nurse

## 2023-05-23 ENCOUNTER — READMISSION MANAGEMENT (OUTPATIENT)
Dept: CALL CENTER | Facility: HOSPITAL | Age: 83
End: 2023-05-23
Payer: MEDICARE

## 2023-05-23 NOTE — OUTREACH NOTE
Medical Week 1 Survey    Flowsheet Row Responses   [facility] patient discharged from? [facility]   Does the patient have one of the following disease processes/diagnoses(primary or secondary)? Other   Week 1 attempt successful? No   Unsuccessful attempts Attempt 1          Manda Payne Registered Nurse

## 2023-05-31 ENCOUNTER — READMISSION MANAGEMENT (OUTPATIENT)
Dept: CALL CENTER | Facility: HOSPITAL | Age: 83
End: 2023-05-31

## 2023-05-31 NOTE — OUTREACH NOTE
Medical Week 1 Survey    Flowsheet Row Responses   [facility] patient discharged from? [facility]   Does the patient have one of the following disease processes/diagnoses(primary or secondary)? Other   Week 1 attempt successful? No   Unsuccessful attempts Attempt 2          Shraddha SANTACRUZ - Licensed Nurse

## 2023-06-06 ENCOUNTER — READMISSION MANAGEMENT (OUTPATIENT)
Dept: CALL CENTER | Facility: HOSPITAL | Age: 83
End: 2023-06-06
Payer: MEDICARE

## 2023-06-06 NOTE — OUTREACH NOTE
Medical Week 3 Survey      Flowsheet Row Responses   [facility] patient discharged from? [facility]   Does the patient have one of the following disease processes/diagnoses(primary or secondary)? Other   Week 3 attempt successful? No   Unsuccessful attempts Attempt 1            Manda Payne Registered Nurse

## 2023-08-23 ENCOUNTER — OFFICE VISIT (OUTPATIENT)
Dept: VASCULAR SURGERY | Facility: HOSPITAL | Age: 83
End: 2023-08-23
Payer: MEDICARE

## 2023-08-23 ENCOUNTER — HOSPITAL ENCOUNTER (OUTPATIENT)
Dept: CARDIOLOGY | Facility: HOSPITAL | Age: 83
Discharge: HOME | End: 2023-08-23
Payer: MEDICARE

## 2023-08-23 VITALS
DIASTOLIC BLOOD PRESSURE: 66 MMHG | SYSTOLIC BLOOD PRESSURE: 160 MMHG | OXYGEN SATURATION: 99 % | RESPIRATION RATE: 18 BRPM | TEMPERATURE: 96.8 F | HEART RATE: 74 BPM

## 2023-08-23 DIAGNOSIS — N18.6 END STAGE RENAL DISEASE ON DIALYSIS: ICD-10-CM

## 2023-08-23 DIAGNOSIS — Z99.2 END STAGE RENAL DISEASE ON DIALYSIS: Primary | ICD-10-CM

## 2023-08-23 DIAGNOSIS — Z99.2 END STAGE RENAL DISEASE ON DIALYSIS: ICD-10-CM

## 2023-08-23 DIAGNOSIS — N18.6 END STAGE RENAL DISEASE ON DIALYSIS: Primary | ICD-10-CM

## 2023-08-23 LAB
BH CV UPPER VENOUS LEFT AXILLARY AUGMENT: NORMAL
BH CV UPPER VENOUS LEFT AXILLARY COMPRESS: NORMAL
BH CV UPPER VENOUS LEFT AXILLARY PHASIC: NORMAL
BH CV UPPER VENOUS LEFT AXILLARY SPONT: NORMAL
BH CV UPPER VENOUS LEFT BASILIC FOREARM COMPRESS: NORMAL
BH CV UPPER VENOUS LEFT BRACHIAL COMPRESS: NORMAL
BH CV UPPER VENOUS LEFT CEPHALIC FOREARM COMPRESS: NORMAL
BH CV UPPER VENOUS LEFT INTERNAL JUGULAR AUGMENT: NORMAL
BH CV UPPER VENOUS LEFT INTERNAL JUGULAR COMPRESS: NORMAL
BH CV UPPER VENOUS LEFT INTERNAL JUGULAR PHASIC: NORMAL
BH CV UPPER VENOUS LEFT INTERNAL JUGULAR SPONT: NORMAL
BH CV UPPER VENOUS LEFT RADIAL COMPRESS: NORMAL
BH CV UPPER VENOUS LEFT SUBCLAVIAN AUGMENT: NORMAL
BH CV UPPER VENOUS LEFT SUBCLAVIAN COMPRESS: NORMAL
BH CV UPPER VENOUS LEFT SUBCLAVIAN PHASIC: NORMAL
BH CV UPPER VENOUS LEFT SUBCLAVIAN SPONT: NORMAL
BH CV UPPER VENOUS LEFT ULNAR COMPRESS: NORMAL
BH CV UPPER VENOUS RIGHT AXILLARY AUGMENT: NORMAL
BH CV UPPER VENOUS RIGHT AXILLARY COMPRESS: NORMAL
BH CV UPPER VENOUS RIGHT AXILLARY PHASIC: NORMAL
BH CV UPPER VENOUS RIGHT AXILLARY SPONT: NORMAL
BH CV UPPER VENOUS RIGHT BASILIC FOREARM COMPRESS: NORMAL
BH CV UPPER VENOUS RIGHT BASILIC UPPER COMPRESS: NORMAL
BH CV UPPER VENOUS RIGHT BRACHIAL COMPRESS: NORMAL
BH CV UPPER VENOUS RIGHT CEPHALIC FOREARM COMPRESS: NORMAL
BH CV UPPER VENOUS RIGHT CEPHALIC UPPER COMPRESS: NORMAL
BH CV UPPER VENOUS RIGHT INTERNAL JUGULAR AUGMENT: NORMAL
BH CV UPPER VENOUS RIGHT INTERNAL JUGULAR COMPRESS: NORMAL
BH CV UPPER VENOUS RIGHT INTERNAL JUGULAR PHASIC: NORMAL
BH CV UPPER VENOUS RIGHT INTERNAL JUGULAR SPONT: NORMAL
BH CV UPPER VENOUS RIGHT RADIAL COMPRESS: NORMAL
BH CV UPPER VENOUS RIGHT SUBCLAVIAN AUGMENT: NORMAL
BH CV UPPER VENOUS RIGHT SUBCLAVIAN COMPRESS: NORMAL
BH CV UPPER VENOUS RIGHT SUBCLAVIAN PHASIC: NORMAL
BH CV UPPER VENOUS RIGHT SUBCLAVIAN SPONT: NORMAL
BH CV UPPER VENOUS RIGHT ULNAR COMPRESS: NORMAL

## 2023-08-23 PROCEDURE — 93970 EXTREMITY STUDY: CPT

## 2023-08-23 PROCEDURE — G0463 HOSPITAL OUTPT CLINIC VISIT: HCPCS | Performed by: SURGERY

## 2023-08-23 RX ORDER — CEFAZOLIN SODIUM 2 G/100ML
2 INJECTION, SOLUTION INTRAVENOUS ONCE
OUTPATIENT
Start: 2023-08-23 | End: 2023-08-23

## 2023-08-23 NOTE — PROGRESS NOTES
[facility]   HISTORY AND PHYSICAL    Patient Name: Del Cueva  : 1940  MRN: [id number]  Primary Care Physician:  Leena Harding MD  Date of admission: (Not on file)    Subjective   Subjective     Chief Complaint: Need for permanent access for hemodialysis    HPI:    Del Cueva is a 82 y.o. male currently undergoing hemodialysis via a right chest catheter that has been present for over a year.  It is not functioning very well.  He has a left arm fistula which thrombosed several months ago.  He is right-handed.  The left arm fistula, a basilic vein transposition, has been his only fistula.    Review of Systems    Non contributory except for the History of Present Illness    Personal History     Past Medical History:   Diagnosis Date    Anemia     Arthritis     CHF (congestive heart failure)     Chronic renal disease, stage 4, severely decreased glomerular filtration rate (GFR) between 15-29 mL/min/1.73 square meter     COPD (chronic obstructive pulmonary disease)     NO INHALERS OR NEBULIZER    Coronary artery disease     Diabetes mellitus     BS NOT CHECKED DAILY    Disease of thyroid gland     Hyperlipidemia     Hypertension     MI (myocardial infarction)     3 STENT PLACED 2021 THROUGH [facility]. FOLLOWED BY DR NEEMA BATES (shortness of breath)     REPORTS CHRONIC ISSUE HX OF COPD       Past Surgical History:   Procedure Laterality Date    ARTERIOVENOUS FISTULA/SHUNT SURGERY Left 3/30/2022    Procedure: LEFT UPPER ARM BASILIC VEIN TRANSPOSITION;  Surgeon: Rigo Palmer MD;  Location: [facility] OR;  Service: Vascular;  Laterality: Left;    CARDIAC CATHETERIZATION Left 2023    Procedure: Left upper extremity fistulagram, possible thrombectomy, possible intervention, possible tunneled dialysis catheter and any indicated procedures;  Surgeon: Keo Laird MD;  [facility]: [facility] CATH INVASIVE LOCATION;  Service: Interventional Radiology;  Laterality: Left;    CORONARY  ANGIOPLASTY WITH STENT PLACEMENT  01/15/2021    CORONARY ARTERY BYPASS GRAFT      DAUGHTER REPORTS OVER 30 YEARS AGO    SHOULDER ROTATOR CUFF REPAIR Left        Family History: family history is not on file. Otherwise pertinent FHx was reviewed and not pertinent to current issue.    Social History:  reports that he quit smoking about 36 years ago. His smoking use included cigarettes. His smokeless tobacco use includes chew. He reports that he does not drink alcohol and does not use drugs.    Home Medications:  Current Outpatient Medications on File Prior to Visit   Medication Sig    aspirin 81 MG EC tablet Take 1 tablet by mouth Daily. PER DR ALTAMIRANO/JARRED AT [facility] TO CONTINUE TAKING UP TO DAY OF SURGERY. MESSAGE LEFT FOR DAUGHTER ENRIQUE    atorvastatin (LIPITOR) 40 MG tablet Take 1 tablet by mouth Daily.    bumetanide (BUMEX) 2 MG tablet Take 1 tablet by mouth 2 (Two) Times a Day.    clopidogrel (PLAVIX) 75 MG tablet Take 1 tablet by mouth Daily. PER DR ALTAMIRANO/JARRED AT [facility] TO CONTINUE TAKING UP TO DAY OF SURGERY. MESSAGE LEFT FOR DAUGHTER ENRIQUE    FeroSul 325 (65 Fe) MG tablet Take 1 tablet by mouth Daily.    glipizide (GLUCOTROL) 5 MG tablet Take 1 tablet by mouth Daily. INSTRUCTED PER ANESTHESIA PROTOCOL    levothyroxine (SYNTHROID, LEVOTHROID) 150 MCG tablet Take 1 tablet by mouth Every Morning Before Breakfast.    metoprolol tartrate (LOPRESSOR) 25 MG tablet Take 1 tablet by mouth 2 (Two) Times a Day.    nateglinide (STARLIX) 60 MG tablet Take 1 tablet by mouth 3 (Three) Times a Day. INSTRUCTED PER ANESTHESIA PROTOCOL    sodium bicarbonate 650 MG tablet Take 2 tablets by mouth 3 (Three) Times a Day.    tamsulosin (FLOMAX) 0.4 MG capsule 24 hr capsule Take 1 capsule by mouth Every Night.     No current facility-administered medications on file prior to visit.          Allergies:  No Known Allergies    Objective   Objective     Vitals:   Temp:  [96.8 øF (36 øC)] 96.8 øF (36 øC)  Heart  Rate:  [74] 74  Resp:  [18] 18  BP: (160)/(66) 160/66    Physical Exam    General: Awake, alert, NAD   Eyes:  WIN   Neck: Supple   Lungs: Clear   Heart: RRR   Abdomen: benign   Musculoskeletal:  normal motor tone, symmetric   Skin: warm, normal turgor   Neuro: strength 5/5 all extremities   Pulses: +2 left radial.      Assessment & Plan   Assessment / Plan     Active Hospital Problems:  There are no active hospital problems to display for this patient.      Diagnoses and all orders for this visit:    1. End stage renal disease on dialysis (Primary)  -     Duplex Initial Vein Mapping Hemodialysis Access Bilateral CAR; Future  -     Duplex Venous Upper Extremity - Bilateral CV-READ; Future  -     Case Request; Standing  -     CBC (No Diff); Future  -     Basic Metabolic Panel; Future  -     ceFAZolin in dextrose (ANCEF) IVPB solution 2 g  -     Case Request    Other orders  -     Follow Anesthesia Guidelines / Protocol; Future  -     Follow Anesthesia Guidelines / Protocol; Standing  -     Obtain Informed Consent; Future  -     Provide NPO Instructions to Patient; Future  -     Chlorhexidine Skin Prep; Future        Assessment/plan:   Mr. Cueva has a poorly functioning catheter and he is permanent access failed.  He is in need for permanent access for hemodialysis.  The plan is for creation of a left upper arm arteriovenous graft with simultaneous replacement of his catheter.  I will obtain a venous duplex to ensure that he is outflow veins are patent in the upper extremity, most significantly the left.  I have discussed with him in detail the mechanics of the procedure, the indications, benefits, risks, alternatives, as well as potential complications to include but not limited to infection, bleeding, reoperation, failure of the access.  He appears to understand and desires to proceed.      Electronically signed by Ramses Rojas MD, 08/23/23, 9:14 AM EDT.

## 2023-08-23 NOTE — H&P (VIEW-ONLY)
[facility]   HISTORY AND PHYSICAL    Patient Name: Del Cueva  : 1940  MRN: [id number]  Primary Care Physician:  Leena Harding MD  Date of admission: (Not on file)    Subjective   Subjective     Chief Complaint: Need for permanent access for hemodialysis    HPI:    Del Cueva is a 82 y.o. male currently undergoing hemodialysis via a right chest catheter that has been present for over a year.  It is not functioning very well.  He has a left arm fistula which thrombosed several months ago.  He is right-handed.  The left arm fistula, a basilic vein transposition, has been his only fistula.    Review of Systems    Non contributory except for the History of Present Illness    Personal History     Past Medical History:   Diagnosis Date    Anemia     Arthritis     CHF (congestive heart failure)     Chronic renal disease, stage 4, severely decreased glomerular filtration rate (GFR) between 15-29 mL/min/1.73 square meter     COPD (chronic obstructive pulmonary disease)     NO INHALERS OR NEBULIZER    Coronary artery disease     Diabetes mellitus     BS NOT CHECKED DAILY    Disease of thyroid gland     Hyperlipidemia     Hypertension     MI (myocardial infarction)     3 STENT PLACED 2021 THROUGH [facility]. FOLLOWED BY DR NEEMA BATES (shortness of breath)     REPORTS CHRONIC ISSUE HX OF COPD       Past Surgical History:   Procedure Laterality Date    ARTERIOVENOUS FISTULA/SHUNT SURGERY Left 3/30/2022    Procedure: LEFT UPPER ARM BASILIC VEIN TRANSPOSITION;  Surgeon: Rigo Palmer MD;  Location: [facility] OR;  Service: Vascular;  Laterality: Left;    CARDIAC CATHETERIZATION Left 2023    Procedure: Left upper extremity fistulagram, possible thrombectomy, possible intervention, possible tunneled dialysis catheter and any indicated procedures;  Surgeon: Keo Laird MD;  Location: [facility] CATH INVASIVE LOCATION;  Service: Interventional Radiology;  Laterality: Left;    CORONARY  ANGIOPLASTY WITH STENT PLACEMENT  01/15/2021    CORONARY ARTERY BYPASS GRAFT      DAUGHTER REPORTS OVER 30 YEARS AGO    SHOULDER ROTATOR CUFF REPAIR Left        Family History: family history is not on file. Otherwise pertinent FHx was reviewed and not pertinent to current issue.    Social History:  reports that he quit smoking about 36 years ago. His smoking use included cigarettes. His smokeless tobacco use includes chew. He reports that he does not drink alcohol and does not use drugs.    Home Medications:  Current Outpatient Medications on File Prior to Visit   Medication Sig    aspirin 81 MG EC tablet Take 1 tablet by mouth Daily. PER DR ALTAMIRANO/JARRED AT [facility] TO CONTINUE TAKING UP TO DAY OF SURGERY. MESSAGE LEFT FOR DAUGHTER ENRIQUE    atorvastatin (LIPITOR) 40 MG tablet Take 1 tablet by mouth Daily.    bumetanide (BUMEX) 2 MG tablet Take 1 tablet by mouth 2 (Two) Times a Day.    clopidogrel (PLAVIX) 75 MG tablet Take 1 tablet by mouth Daily. PER DR ALTAMIRANO/JARRED AT [facility] TO CONTINUE TAKING UP TO DAY OF SURGERY. MESSAGE LEFT FOR DAUGHTER ENRIQUE    FeroSul 325 (65 Fe) MG tablet Take 1 tablet by mouth Daily.    glipizide (GLUCOTROL) 5 MG tablet Take 1 tablet by mouth Daily. INSTRUCTED PER ANESTHESIA PROTOCOL    levothyroxine (SYNTHROID, LEVOTHROID) 150 MCG tablet Take 1 tablet by mouth Every Morning Before Breakfast.    metoprolol tartrate (LOPRESSOR) 25 MG tablet Take 1 tablet by mouth 2 (Two) Times a Day.    nateglinide (STARLIX) 60 MG tablet Take 1 tablet by mouth 3 (Three) Times a Day. INSTRUCTED PER ANESTHESIA PROTOCOL    sodium bicarbonate 650 MG tablet Take 2 tablets by mouth 3 (Three) Times a Day.    tamsulosin (FLOMAX) 0.4 MG capsule 24 hr capsule Take 1 capsule by mouth Every Night.     No current facility-administered medications on file prior to visit.          Allergies:  No Known Allergies    Objective   Objective     Vitals:   Temp:  [96.8 °F (36 °C)] 96.8 °F (36 °C)  Heart  Rate:  [74] 74  Resp:  [18] 18  BP: (160)/(66) 160/66    Physical Exam    General: Awake, alert, NAD   Eyes:  WIN   Neck: Supple   Lungs: Clear   Heart: RRR   Abdomen: benign   Musculoskeletal:  normal motor tone, symmetric   Skin: warm, normal turgor   Neuro: strength 5/5 all extremities   Pulses: +2 left radial.      Assessment & Plan   Assessment / Plan     Active Hospital Problems:  There are no active hospital problems to display for this patient.      Diagnoses and all orders for this visit:    1. End stage renal disease on dialysis (Primary)  -     Duplex Initial Vein Mapping Hemodialysis Access Bilateral CAR; Future  -     Duplex Venous Upper Extremity - Bilateral CV-READ; Future  -     Case Request; Standing  -     CBC (No Diff); Future  -     Basic Metabolic Panel; Future  -     ceFAZolin in dextrose (ANCEF) IVPB solution 2 g  -     Case Request    Other orders  -     Follow Anesthesia Guidelines / Protocol; Future  -     Follow Anesthesia Guidelines / Protocol; Standing  -     Obtain Informed Consent; Future  -     Provide NPO Instructions to Patient; Future  -     Chlorhexidine Skin Prep; Future        Assessment/plan:   Mr. Cueva has a poorly functioning catheter and he is permanent access failed.  He is in need for permanent access for hemodialysis.  The plan is for creation of a left upper arm arteriovenous graft with simultaneous replacement of his catheter.  I will obtain a venous duplex to ensure that he is outflow veins are patent in the upper extremity, most significantly the left.  I have discussed with him in detail the mechanics of the procedure, the indications, benefits, risks, alternatives, as well as potential complications to include but not limited to infection, bleeding, reoperation, failure of the access.  He appears to understand and desires to proceed.      Electronically signed by Ramses Rojas MD, 08/23/23, 9:14 AM EDT.

## 2023-08-28 NOTE — PRE-PROCEDURE INSTRUCTIONS
IMPORTANT INSTRUCTIONS - PRE-ADMISSION TESTING  DO NOT EAT OR CHEW anything after midnight the night before your procedure.    You may have CLEAR liquids up to ___2___ hours prior to ARRIVAL time.   Take the following medications the morning of your procedure with JUST A SIP OF WATER:  _____ASA, ATORVASTATIN, PLAVIX, IRON, SYNTHROID, METOPROLOL, __________________________________________________________________________________________________________________________________________________________________________________    DO NOT BRING your medications to the hospital with you, UNLESS something has changed since your PRE-Admission Testing appointment.  Hold all vitamins, supplements, and NSAIDS (Non- steroidal anti-inflammatory meds) for one week prior to surgery (you MAY take Tylenol or Acetaminophen).  If you are diabetic, check your blood sugar the morning of your procedure. If it is less than 70 or if you are feeling symptomatic, call the following number for further instructions: [phone]__.  Use your inhalers/nebulizers as usual, the morning of your procedure. BRING YOUR INHALERS with you.   Bring your CPAP or BIPAP to hospital, ONLY IF YOU WILL BE SPENDING THE NIGHT.   Make sure you have a ride home and have someone who will stay with you the day of your procedure after you go home.  If you have any questions, please call your Pre-Admission Testing Nurse, __DAWHERNÁN______________ at [phone]___________.   Per anesthesia request, do not smoke for 24 hours before your procedure or as instructed by your surgeon.    BATHING INSTRUCTIONS GIVEN. NO JEWELRY DAY OF PROCEDURE.  NO CHEWING TOBACCO 24 HOURS PRIOR TO PROCEDURE  ENTRANCE A ELEVATOR A 3RD FLOOR

## 2023-08-28 NOTE — PAT
MESSAGE SENT TO BURT AT [facility] TO VERIFY THAT DR AHMADI OK WITH PT CONTINUING ASA AND PLAVIX UP TO AND INCLUDING DAY OF SURGERY

## 2023-08-29 ENCOUNTER — HOSPITAL ENCOUNTER (OUTPATIENT)
Facility: HOSPITAL | Age: 83
Setting detail: HOSPITAL OUTPATIENT SURGERY
Discharge: HOME | End: 2023-08-29
Attending: SURGERY | Admitting: SURGERY
Payer: MEDICARE

## 2023-08-29 ENCOUNTER — ANESTHESIA EVENT (OUTPATIENT)
Dept: PERIOP | Facility: HOSPITAL | Age: 83
End: 2023-08-29
Payer: MEDICARE

## 2023-08-29 ENCOUNTER — APPOINTMENT (OUTPATIENT)
Dept: GENERAL RADIOLOGY | Facility: HOSPITAL | Age: 83
End: 2023-08-29
Payer: MEDICARE

## 2023-08-29 ENCOUNTER — ANESTHESIA (OUTPATIENT)
Dept: PERIOP | Facility: HOSPITAL | Age: 83
End: 2023-08-29
Payer: MEDICARE

## 2023-08-29 VITALS
SYSTOLIC BLOOD PRESSURE: 166 MMHG | HEART RATE: 104 BPM | RESPIRATION RATE: 16 BRPM | HEIGHT: 69 IN | TEMPERATURE: 97.5 F | WEIGHT: 195.11 LBS | DIASTOLIC BLOOD PRESSURE: 64 MMHG | OXYGEN SATURATION: 95 % | BODY MASS INDEX: 28.9 KG/M2

## 2023-08-29 DIAGNOSIS — Z99.2 END STAGE RENAL DISEASE ON DIALYSIS: ICD-10-CM

## 2023-08-29 DIAGNOSIS — N18.6 END STAGE RENAL DISEASE ON DIALYSIS: ICD-10-CM

## 2023-08-29 LAB
ANION GAP SERPL CALCULATED.3IONS-SCNC: 11.6 MMOL/L (ref 5–15)
BUN SERPL-MCNC: 28 MG/DL (ref 8–23)
BUN/CREAT SERPL: 6.9 (ref 7–25)
CALCIUM SPEC-SCNC: 9.2 MG/DL (ref 8.6–10.5)
CHLORIDE SERPL-SCNC: 102 MMOL/L (ref 98–107)
CO2 SERPL-SCNC: 23.4 MMOL/L (ref 22–29)
CREAT SERPL-MCNC: 4.08 MG/DL (ref 0.76–1.27)
DEPRECATED RDW RBC AUTO: 51.8 FL (ref 37–54)
EGFRCR SERPLBLD CKD-EPI 2021: 14 ML/MIN/1.73
ERYTHROCYTE [DISTWIDTH] IN BLOOD BY AUTOMATED COUNT: 15.1 % (ref 12.3–15.4)
GLUCOSE BLDC GLUCOMTR-MCNC: 153 MG/DL (ref 70–99)
GLUCOSE SERPL-MCNC: 94 MG/DL (ref 65–99)
HCT VFR BLD AUTO: 33.4 % (ref 37.5–51)
HGB BLD-MCNC: 11.4 G/DL (ref 13–17.7)
MCH RBC QN AUTO: 32.9 PG (ref 26.6–33)
MCHC RBC AUTO-ENTMCNC: 34.1 G/DL (ref 31.5–35.7)
MCV RBC AUTO: 96.5 FL (ref 79–97)
PLATELET # BLD AUTO: 182 10*3/MM3 (ref 140–450)
PMV BLD AUTO: 10.6 FL (ref 6–12)
POTASSIUM SERPL-SCNC: 4.9 MMOL/L (ref 3.5–5.2)
QT INTERVAL: 392 MS
QTC INTERVAL: 448 MS
RBC # BLD AUTO: 3.46 10*6/MM3 (ref 4.14–5.8)
SODIUM SERPL-SCNC: 137 MMOL/L (ref 136–145)
WBC NRBC COR # BLD: 7.93 10*3/MM3 (ref 3.4–10.8)

## 2023-08-29 PROCEDURE — 25010000002 MIDAZOLAM PER 1MG: Performed by: ANESTHESIOLOGY

## 2023-08-29 PROCEDURE — 25010000002 HEPARIN (PORCINE) PER 1000 UNITS: Performed by: NURSE ANESTHETIST, CERTIFIED REGISTERED

## 2023-08-29 PROCEDURE — 25010000002 BUPIVACAINE (PF) 0.5 % SOLUTION 10 ML VIAL: Performed by: SURGERY

## 2023-08-29 PROCEDURE — 76000 FLUOROSCOPY <1 HR PHYS/QHP: CPT

## 2023-08-29 PROCEDURE — 36581 REPLACE TUNNELED CV CATH: CPT | Performed by: SURGERY

## 2023-08-29 PROCEDURE — 25010000002 ONDANSETRON PER 1 MG: Performed by: NURSE ANESTHETIST, CERTIFIED REGISTERED

## 2023-08-29 PROCEDURE — 36830 ARTERY-VEIN NONAUTOGRAFT: CPT

## 2023-08-29 PROCEDURE — 25010000002 PROTAMINE SULFATE PER 10 MG: Performed by: NURSE ANESTHETIST, CERTIFIED REGISTERED

## 2023-08-29 PROCEDURE — 0 LIDOCAINE 1 % SOLUTION 20 ML VIAL: Performed by: SURGERY

## 2023-08-29 PROCEDURE — C1768 GRAFT, VASCULAR: HCPCS | Performed by: SURGERY

## 2023-08-29 PROCEDURE — C1750 CATH, HEMODIALYSIS,LONG-TERM: HCPCS | Performed by: SURGERY

## 2023-08-29 PROCEDURE — 25010000002 CEFAZOLIN IN DEXTROSE 2000 MG/ 100 ML SOLUTION: Performed by: SURGERY

## 2023-08-29 PROCEDURE — 25010000002 FENTANYL CITRATE (PF) 50 MCG/ML SOLUTION: Performed by: NURSE ANESTHETIST, CERTIFIED REGISTERED

## 2023-08-29 PROCEDURE — 93010 ELECTROCARDIOGRAM REPORT: CPT | Performed by: INTERNAL MEDICINE

## 2023-08-29 PROCEDURE — 82948 REAGENT STRIP/BLOOD GLUCOSE: CPT

## 2023-08-29 PROCEDURE — 25010000002 BUPIVACAINE (PF) 0.5 % SOLUTION: Performed by: SURGERY

## 2023-08-29 PROCEDURE — 85027 COMPLETE CBC AUTOMATED: CPT | Performed by: SURGERY

## 2023-08-29 PROCEDURE — 80048 BASIC METABOLIC PNL TOTAL CA: CPT | Performed by: SURGERY

## 2023-08-29 PROCEDURE — 25010000002 PROPOFOL 10 MG/ML EMULSION: Performed by: NURSE ANESTHETIST, CERTIFIED REGISTERED

## 2023-08-29 PROCEDURE — 25010000002 CEFAZOLIN PER 500 MG: Performed by: SURGERY

## 2023-08-29 PROCEDURE — 36581 REPLACE TUNNELED CV CATH: CPT

## 2023-08-29 PROCEDURE — 93005 ELECTROCARDIOGRAM TRACING: CPT | Performed by: ANESTHESIOLOGY

## 2023-08-29 PROCEDURE — 25010000002 DEXAMETHASONE PER 1 MG: Performed by: NURSE ANESTHETIST, CERTIFIED REGISTERED

## 2023-08-29 PROCEDURE — 36830 ARTERY-VEIN NONAUTOGRAFT: CPT | Performed by: SURGERY

## 2023-08-29 PROCEDURE — 25010000002 HEPARIN (PORCINE) PER 1000 UNITS: Performed by: SURGERY

## 2023-08-29 PROCEDURE — 77001 FLUOROGUIDE FOR VEIN DEVICE: CPT | Performed by: SURGERY

## 2023-08-29 DEVICE — LIGACLIP MCA MULTIPLE CLIP APPLIERS, 20 MEDIUM CLIPS
Type: IMPLANTABLE DEVICE | Site: ARM | Status: FUNCTIONAL
Brand: LIGACLIP

## 2023-08-29 DEVICE — LIGACLIP MCA MULTIPLE CLIP APPLIERS, 20 SMALL CLIPS
Type: IMPLANTABLE DEVICE | Site: ARM | Status: FUNCTIONAL
Brand: LIGACLIP

## 2023-08-29 DEVICE — ABSORBABLE HEMOSTAT (OXIDIZED REGENERATED CELLULOSE, U.S.P.)
Type: IMPLANTABLE DEVICE | Site: ARM | Status: FUNCTIONAL
Brand: SURGICEL

## 2023-08-29 DEVICE — IMPLANTABLE DEVICE: Type: IMPLANTABLE DEVICE | Site: ARM | Status: FUNCTIONAL

## 2023-08-29 RX ORDER — HEPARIN SODIUM 1000 [USP'U]/ML
INJECTION, SOLUTION INTRAVENOUS; SUBCUTANEOUS AS NEEDED
Status: DISCONTINUED | OUTPATIENT
Start: 2023-08-29 | End: 2023-08-29 | Stop reason: HOSPADM

## 2023-08-29 RX ORDER — PROMETHAZINE HYDROCHLORIDE 12.5 MG/1
25 TABLET ORAL ONCE AS NEEDED
Status: DISCONTINUED | OUTPATIENT
Start: 2023-08-29 | End: 2023-08-29 | Stop reason: HOSPADM

## 2023-08-29 RX ORDER — ONDANSETRON 2 MG/ML
INJECTION INTRAMUSCULAR; INTRAVENOUS AS NEEDED
Status: DISCONTINUED | OUTPATIENT
Start: 2023-08-29 | End: 2023-08-29 | Stop reason: SURG

## 2023-08-29 RX ORDER — GINSENG 100 MG
CAPSULE ORAL AS NEEDED
Status: DISCONTINUED | OUTPATIENT
Start: 2023-08-29 | End: 2023-08-29 | Stop reason: HOSPADM

## 2023-08-29 RX ORDER — SODIUM CHLORIDE 9 MG/ML
9 INJECTION, SOLUTION INTRAVENOUS CONTINUOUS PRN
Status: DISCONTINUED | OUTPATIENT
Start: 2023-08-29 | End: 2023-08-29 | Stop reason: HOSPADM

## 2023-08-29 RX ORDER — DEXAMETHASONE SODIUM PHOSPHATE 4 MG/ML
INJECTION, SOLUTION INTRA-ARTICULAR; INTRALESIONAL; INTRAMUSCULAR; INTRAVENOUS; SOFT TISSUE AS NEEDED
Status: DISCONTINUED | OUTPATIENT
Start: 2023-08-29 | End: 2023-08-29 | Stop reason: SURG

## 2023-08-29 RX ORDER — HEPARIN SODIUM 1000 [USP'U]/ML
INJECTION, SOLUTION INTRAVENOUS; SUBCUTANEOUS AS NEEDED
Status: DISCONTINUED | OUTPATIENT
Start: 2023-08-29 | End: 2023-08-29 | Stop reason: SURG

## 2023-08-29 RX ORDER — PROMETHAZINE HYDROCHLORIDE 25 MG/1
25 SUPPOSITORY RECTAL ONCE AS NEEDED
Status: DISCONTINUED | OUTPATIENT
Start: 2023-08-29 | End: 2023-08-29 | Stop reason: HOSPADM

## 2023-08-29 RX ORDER — ONDANSETRON 2 MG/ML
4 INJECTION INTRAMUSCULAR; INTRAVENOUS ONCE AS NEEDED
Status: DISCONTINUED | OUTPATIENT
Start: 2023-08-29 | End: 2023-08-29 | Stop reason: HOSPADM

## 2023-08-29 RX ORDER — EPHEDRINE SULFATE 50 MG/ML
INJECTION, SOLUTION INTRAVENOUS AS NEEDED
Status: DISCONTINUED | OUTPATIENT
Start: 2023-08-29 | End: 2023-08-29 | Stop reason: SURG

## 2023-08-29 RX ORDER — PROPOFOL 10 MG/ML
VIAL (ML) INTRAVENOUS AS NEEDED
Status: DISCONTINUED | OUTPATIENT
Start: 2023-08-29 | End: 2023-08-29 | Stop reason: SURG

## 2023-08-29 RX ORDER — OXYCODONE HCL 5 MG/5 ML
5 SOLUTION, ORAL ORAL EVERY 4 HOURS PRN
Status: DISCONTINUED | OUTPATIENT
Start: 2023-08-29 | End: 2023-08-29 | Stop reason: HOSPADM

## 2023-08-29 RX ORDER — FENTANYL CITRATE 50 UG/ML
INJECTION, SOLUTION INTRAMUSCULAR; INTRAVENOUS AS NEEDED
Status: DISCONTINUED | OUTPATIENT
Start: 2023-08-29 | End: 2023-08-29 | Stop reason: SURG

## 2023-08-29 RX ORDER — PROTAMINE SULFATE 10 MG/ML
INJECTION, SOLUTION INTRAVENOUS AS NEEDED
Status: DISCONTINUED | OUTPATIENT
Start: 2023-08-29 | End: 2023-08-29 | Stop reason: SURG

## 2023-08-29 RX ORDER — BUPIVACAINE HYDROCHLORIDE 5 MG/ML
INJECTION, SOLUTION EPIDURAL; INTRACAUDAL AS NEEDED
Status: DISCONTINUED | OUTPATIENT
Start: 2023-08-29 | End: 2023-08-29 | Stop reason: HOSPADM

## 2023-08-29 RX ORDER — CEFAZOLIN SODIUM 2 G/100ML
2 INJECTION, SOLUTION INTRAVENOUS ONCE
Status: COMPLETED | OUTPATIENT
Start: 2023-08-29 | End: 2023-08-29

## 2023-08-29 RX ORDER — LIDOCAINE HYDROCHLORIDE 20 MG/ML
INJECTION, SOLUTION EPIDURAL; INFILTRATION; INTRACAUDAL; PERINEURAL AS NEEDED
Status: DISCONTINUED | OUTPATIENT
Start: 2023-08-29 | End: 2023-08-29 | Stop reason: SURG

## 2023-08-29 RX ORDER — OXYCODONE HYDROCHLORIDE AND ACETAMINOPHEN 5; 325 MG/1; MG/1
1 TABLET ORAL EVERY 6 HOURS PRN
Qty: 20 TABLET | Refills: 0 | Status: SHIPPED | OUTPATIENT
Start: 2023-08-29

## 2023-08-29 RX ORDER — MIDAZOLAM HYDROCHLORIDE 2 MG/2ML
1 INJECTION, SOLUTION INTRAMUSCULAR; INTRAVENOUS ONCE
Status: COMPLETED | OUTPATIENT
Start: 2023-08-29 | End: 2023-08-29

## 2023-08-29 RX ORDER — ACETAMINOPHEN 500 MG
1000 TABLET ORAL ONCE
Status: COMPLETED | OUTPATIENT
Start: 2023-08-29 | End: 2023-08-29

## 2023-08-29 RX ORDER — KETOROLAC TROMETHAMINE 30 MG/ML
INJECTION, SOLUTION INTRAMUSCULAR; INTRAVENOUS AS NEEDED
Status: DISCONTINUED | OUTPATIENT
Start: 2023-08-29 | End: 2023-08-29

## 2023-08-29 RX ADMIN — DEXAMETHASONE SODIUM PHOSPHATE 4 MG: 4 INJECTION, SOLUTION INTRAMUSCULAR; INTRAVENOUS at 14:10

## 2023-08-29 RX ADMIN — PROPOFOL 80 MG: 10 INJECTION, EMULSION INTRAVENOUS at 14:07

## 2023-08-29 RX ADMIN — MIDAZOLAM HYDROCHLORIDE 1 MG: 1 INJECTION, SOLUTION INTRAMUSCULAR; INTRAVENOUS at 13:33

## 2023-08-29 RX ADMIN — SODIUM CHLORIDE 9 ML/HR: 9 INJECTION, SOLUTION INTRAVENOUS at 10:57

## 2023-08-29 RX ADMIN — CEFAZOLIN SODIUM 2 G: 2 INJECTION, SOLUTION INTRAVENOUS at 14:11

## 2023-08-29 RX ADMIN — EPHEDRINE SULFATE 10 MG: 50 INJECTION INTRAVENOUS at 15:09

## 2023-08-29 RX ADMIN — FENTANYL CITRATE 25 MCG: 50 INJECTION, SOLUTION INTRAMUSCULAR; INTRAVENOUS at 14:29

## 2023-08-29 RX ADMIN — HEPARIN SODIUM 5000 UNITS: 1000 INJECTION INTRAVENOUS; SUBCUTANEOUS at 15:39

## 2023-08-29 RX ADMIN — EPHEDRINE SULFATE 10 MG: 50 INJECTION INTRAVENOUS at 14:41

## 2023-08-29 RX ADMIN — ONDANSETRON 4 MG: 2 INJECTION INTRAMUSCULAR; INTRAVENOUS at 15:51

## 2023-08-29 RX ADMIN — FENTANYL CITRATE 25 MCG: 50 INJECTION, SOLUTION INTRAMUSCULAR; INTRAVENOUS at 15:15

## 2023-08-29 RX ADMIN — EPHEDRINE SULFATE 10 MG: 50 INJECTION INTRAVENOUS at 16:25

## 2023-08-29 RX ADMIN — LIDOCAINE HYDROCHLORIDE 80 MG: 20 INJECTION, SOLUTION EPIDURAL; INFILTRATION; INTRACAUDAL; PERINEURAL at 14:07

## 2023-08-29 RX ADMIN — PROPOFOL 50 MG: 10 INJECTION, EMULSION INTRAVENOUS at 16:54

## 2023-08-29 RX ADMIN — EPHEDRINE SULFATE 10 MG: 50 INJECTION INTRAVENOUS at 15:42

## 2023-08-29 RX ADMIN — EPHEDRINE SULFATE 10 MG: 50 INJECTION INTRAVENOUS at 14:37

## 2023-08-29 RX ADMIN — ACETAMINOPHEN 1000 MG: 500 TABLET ORAL at 10:57

## 2023-08-29 RX ADMIN — PROTAMINE SULFATE 40 MG: 10 INJECTION, SOLUTION INTRAVENOUS at 16:27

## 2023-08-29 NOTE — ANESTHESIA POSTPROCEDURE EVALUATION
Patient: Del Cueva    Procedure Summary       Date: 08/29/23 Room / Location: [facility] OR 01 / [facility] MAIN OR    Anesthesia Start: 1401 Anesthesia Stop: 1717    Procedure: Creation of left arm arteriovenous graft, replacement of tunneled catheter for hemodialysis (Left) Diagnosis:       End stage renal disease on dialysis      (End stage renal disease on dialysis [N18.6, Z99.2])    Surgeons: Ramses Rojas MD Provider: Burak Galaviz CRNA    Anesthesia Type: general ASA Status: 4            Anesthesia Type: general    Vitals  Vitals Value Taken Time   /55 08/29/23 1740   Temp 36 øC (96.8 øF) 08/29/23 1720   Pulse 106 08/29/23 1745   Resp 12 08/29/23 1740   SpO2 94 % 08/29/23 1745   Vitals shown include unvalidated device data.        Post Anesthesia Care and Evaluation    Patient location during evaluation: bedside  Patient participation: complete - patient participated  Level of consciousness: awake  Pain management: adequate    Airway patency: patent  PONV Status: none  Cardiovascular status: acceptable and stable  Respiratory status: acceptable  Hydration status: acceptable    Comments: An Anesthesiologist personally participated in the most demanding procedures (including induction and emergence if applicable) in the anesthesia plan, monitored the course of anesthesia administration at frequent intervals and remained physically present and available for immediate diagnosis and treatment of emergencies.

## 2023-08-29 NOTE — DISCHARGE INSTRUCTIONS
DISCHARGE INSTRUCTIONS  DIALYSIS SHUNT      For your surgery you had:  General anesthesia (you may have a sore throat for the first 24 hours)  You received a medicated patch for nausea prevention today (behind your ear). It is recommended that you remove it 24-48 hours post-operatively. It must be removed within 72 hours.   You may experience dizziness, drowsiness, or light-headedness for several hours following surgery/procedure.  Do not stay alone today or tonight.  Limit your activity for 24 hours.  Resume your diet slowly.  Follow whatever special dietary instructions you may have been given by your doctor.  You should not drive or operate machinery, drink alcohol, or sign legally binding documents for 24 hours or while you are taking pain medication.    NOTIFY YOUR DOCTOR IF YOU EXPERIENCE ANY OF THE FOLLOWING:  Temperature greater than 101 degrees Fahrenheit  Shaking Chills  Redness or excessive drainage from incision  Nausea, vomiting and/or pain that is not controlled by prescribed medications  Increase in bleeding or bleeding that is excessive  Unable to urinate in 6 hours after surgery  If unable to reach your doctor, please go to the closest Emergency Room  Keep _left_ arm elevated on pillows.    Limit excessive bending of extremity on _left_ side.    No tight clothing to site.    No needle sticks or blood pressures in _left_ arm.    Limit sleeping on _left_ side.    Notify physician of excessive bleeding at site.    Last dose of pain medication was given at:  Tylenol 1057  SPECIAL INSTRUCTIONS:      Call Dr. Rojas's office tomorrow for  follow-up [phone]      Do not get dialysis catheter wet.     Notify Dr. Rojas if Hematoma increases in size  beyond currently marked area

## 2023-08-29 NOTE — OP NOTE
ARTERIOVENOUS FISTULA FORMATION WITH HEMODIALYSIS CATHETER INSERTION  Procedure Report    Patient Name:  Del Cueva  YOB: 1940    Date of Surgery:  8/29/2023     Indications: The patient is an end-stage renal disease undergoing hemodialysis via a right chest catheter.  The catheter is malfunctioning and needs replacement.  At the same time he needs permanent access.  At this time for replacement of the catheter and creation of a left arm arteriovenous graft.    Pre-op Diagnosis:   End stage renal disease on dialysis [N18.6, Z99.2]       Post-Op Diagnosis Codes:     * End stage renal disease on dialysis [N18.6, Z99.2]    Procedure(s):  Creation of left arm arteriovenous graft, replacement of tunneled catheter for hemodialysis    Staff:  Surgeon(s):  Ramses Rojas MD    Assistant: Kate Carlson RN CSA    Anesthesia: General    Estimated Blood Loss: 150 mL    Implants:    Implant Name Type Inv. Item Serial No.  Lot No. LRB No. Used Action   CLIPAPPLR M/ ENDO LIGACLIP 20CLP 11IN MD - [id number] Implant CLIPAPPLR M/ ENDO LIGACLIP 20CLP 11IN MD  [employer]  DIV OF J AND J [id number] Left 1 Implanted   CLIPAPPLR M/ ENDO LIGACLIP 9 3/8IN SM - [id number] Implant CLIPAPPLR M/ ENDO LIGACLIP 9 3/8IN SM  [employer]  DIV OF J [employer] J [id number] Left 1 Implanted   CLIPAPPLR M/ ENDO LIGACLIP 9 3/8IN SM - [id number] Implant CLIPAPPLR M/ ENDO LIGACLIP 9 3/8IN   [employer]  DIV OF J AND J [id number] Left 1 Implanted   GRFT VASC PROPATEN STD STRCH 6X40 - [id number] - [id number] Implant GRFT VASC PROPATEN STD STRCH 6X40 [id number] [employer] NA Left 1 Implanted   HEMOST ABS SURGICEL 2X14 - [id number] Implant HEMOST ABS SURGICEL 2X14  [employer]  DIV OF J AND J [id number] Left 1 Implanted       Specimen: None       Findings: Upon completion of the procedure fluoroscopy demonstrated the right IJ catheter to be in satisfactory position with excellent blood return and easy  infusibility via both ports.  The tip is at the junction of the superior vena cava and right atrium.  Following creation of the left arm arteriovenous graft and excellent thrill can be identified at the arteriovenous anastomosis with a strong Doppler signal with continuous diastolic flow in the outflow vein.    Complications: None    Description of Procedure: The patient was brought into the operating room and was placed in the supine position.  He was then induced into general anesthesia via LMA.  He was then positioned with the head turned to the left and the right chest exposed.  He was then prepped and draped in the usual aseptic fashion.  A timeout was taken to confirm patient and procedure.  Local anesthesia was then applied at the projected sites of incision and tunneling.  The old catheter was palpated and the point where it entered towards the internal jugular vein on the right neck was identified.  An incision was then made in a transverse fashion.  Blunt and sharp dissection were used to dissect down into the catheter.  The catheter was then controlled and clamped.  The catheter was then transected above the clamp.  A sidewall of the catheter was clamped with a mosquito to secure the catheter.  A wire was then advanced into the other lumen approximately 50 cm.  The distal end of the catheter was then removed leaving the wire in place.  The tip of the catheter was inspected and found to be intact.  A peel-away sheath was then advanced over the wire.  A small transverse incision was then made in the right lateral upper chest and a new catheter brought through a tunnel.  The catheter was then advanced into the peel-away sheath which was removed leaving the catheter in place.  The entire course of the catheter was inspected under fluoroscopy with the above-mentioned findings.  A 23 cm tip to cuff Hemosplit HK catheter was used.  Both ports were aspirated and flushed with heparin saline with excellent blood  return and infusibility.  The neck incision was approximated using 3-0 Vicryl.  The entry point of the catheter in the skin was approximated using 3-0 Vicryl around the catheter.  The catheter was secured in place at 2 different sites using 2-0 nylon.  The external portion of the old catheter was then dissected and the cuff freed and removed.  The catheter was discarded.  Each port of the new catheter was flushed with 2 cc of heparin in a concentration of 1000 units/mL.  The ports were capped.  A Biopatch was applied.  Dressings were applied.  The patient tolerated the procedure well.  The drapes were then removed and the patient was then positioned with the left arm extended on an armboard.  The was then prepped and draped in the usual aseptic fashion.  A stockinette was applied to the hand and forearm and was secured in place using Coban.  Ioban was applied to all the exposed areas of skin.  A timeout was taken to confirm patient, procedure and laterality.  Local anesthesia was then administered at the projected sites of incision.  An incision was then made in a longitudinal fashion over the proximal left upper arm approximately extending just to the axillary hairline.  The subcutaneous tissue was traversed using electrocautery.  Blunt and sharp dissection were then used to dissect the brachial artery and brachial veins just below the level of the junction into the axillary artery and vein.  Circumferential control was obtained of both both proximally and distally.  A counterincision was made in the distal upper arm in a transverse fashion.  A tunnel was created from the vein along the lateral aspect of the arm and then via the counterincision back to the medial aspect of the arm towards the axilla.  The graft configuration was designed to be counterclockwise flow.  The graft, 6 mm Propaten PTFE was soaked in antibiotic solution and brought into the field and brought through the tunnel taking extreme care to avoid  any twisting.   it was flushed with heparin saline with no resistance.  The apex was identified and inspected and there was no kink.  The patient was given systemic anticoagulation in the form of heparin 5000 units IV.  3 minutes were allowed for the heparin to circulate.  The end of the graft was then anastomosed to the side of the vein end to side using 6-0 Prolene in a running fashion with 1 stitch beginning at 6 and one at 12 and run along the sides.  After completing the anastomosis the graft was flushed and backbleeding was inspected and found to be satisfactory.  The brachial artery was then clamped proximally and distally.  A longitudinal arteriotomy was made using an 11 blade and extended using Dixon scissors.  The graft was then trimmed in length and fashioned to me the dimensions of the arteriotomy.  An anastomosis was created using 6-0 Prolene in a running fashion.  Just prior to completion of the anastomosis all vessels were bled.  The anastomosis was completed.  Blood flow was reestablished.  The above-mentioned findings were identified.  Heparin was reversed with protamine.  Repeat examination after reversal of the heparin and allowing 3 minutes to ensure satisfactory circulation of the protamine revealed no change in the findings.  The wounds were inspected for hemostasis and hemostasis assured.  The two wounds were then approximated with the counterincision using 3-0 Vicryl in a running fashion for approximation of the dermis and subcutaneous tissue followed by Dermabond.  The axillary wound approximated using 3-0 Vicryl in a running fashion for approximation of the subcutaneous tissue followed by 4-0 Monocryl in a running subcuticular stitch followed by Dermabond.  The patient tolerated the procedure well.       Assistant: Kate Carlson RN CSA  was responsible for performing the following activities:  First assist  and their skilled assistance was necessary for the success of this case.    Ramses  MD Crystal     Date: 8/29/2023  Time: 16:50 EDT

## 2023-08-29 NOTE — ANESTHESIA PREPROCEDURE EVALUATION
Anesthesia Evaluation     Patient summary reviewed and Nursing notes reviewed   no history of anesthetic complications:   NPO Solid Status: > 8 hours  NPO Liquid Status: > 2 hours           Airway   Mallampati: II  TM distance: >3 FB  Neck ROM: full  No difficulty expected and Large neck circumference  Dental    (+) upper dentures and lower dentures    Pulmonary - normal exam    breath sounds clear to auscultation  (+) COPD mild,shortness of breath    ROS comment: Stable resp status, on no meds  Cardiovascular - normal exam  Exercise tolerance: poor (<4 METS)    Rhythm: regular  Rate: normal    (+) hypertension, past MI , CAD, CABG, CHF , hyperlipidemia    ROS comment: Stable, no cx    Neuro/Psych  (+) seizures well controlled, TIA, CVA, syncope    ROS Comment: None since 3/23, no meds, noncompliant  GI/Hepatic/Renal/Endo    (+) renal disease ESRD, diabetes mellitus type 2, thyroid problem     Musculoskeletal     Abdominal    Substance History - negative use     OB/GYN negative ob/gyn ROS         Other   arthritis,            <4 mets soae hx of cad sp cabg, nstemi sp 3 drug eluding stents 01/2021  echo 2021 fibrosclerosis of av, moderate mr ef 51%  ABNORMAL ECG -  Sinus rhythm  ST anbnormalities.  No previous ECG available for comparison     Latest Reference Range & Units 01/24/21 00:00   Hemoglobin 13.5 - 17.5 g/dL 8.9 (L) (E)   Hematocrit 41.0 - 53.0 % 27.7 (L) (E)   (L): Data is abnormally low  (E): External lab result    Await repeat labs           Anesthesia Plan    ASA 4     general     (Patient understands anesthesia not responsible for dental damage.  Very [place])  intravenous induction     Anesthetic plan, risks, benefits, and alternatives have been provided, discussed and informed consent has been obtained with: patient.  Pre-procedure education provided  Plan discussed with CRNA.      CODE STATUS:

## 2023-08-30 ENCOUNTER — TELEPHONE (OUTPATIENT)
Dept: VASCULAR SURGERY | Facility: HOSPITAL | Age: 83
End: 2023-08-30
Payer: MEDICARE

## 2023-08-30 NOTE — TELEPHONE ENCOUNTER
SCHEDULED AN APPT WITH DR AHMADI ON 9/13/23 AT 1:30 PM. SPOKE TO A FEMALE THAT ANSWERED HIS PHONE AND GAVE HER THE DATE AND TIME.

## 2023-08-30 NOTE — TELEPHONE ENCOUNTER
Caller: Del Cueva    Relationship to patient: Self    Best call back number: [phone]     Type of visit: POST OP     Requested date: F/U UNKNOWN      If rescheduling, when is the original appointment: DISCHARGED 8/29/23     Additional notes:PATIENT WOULD LIKE A CALL BACK WITH APPOINTMENT. PADMA OK

## 2023-09-11 ENCOUNTER — TELEPHONE (OUTPATIENT)
Dept: VASCULAR SURGERY | Facility: HOSPITAL | Age: 83
End: 2023-09-11
Payer: MEDICARE

## (undated) DEVICE — SUT PROLN 6/0 C1 D/A 30IN 8706H

## (undated) DEVICE — SUT PROLN 6/0 BV1 D/A 30IN 8709H

## (undated) DEVICE — 3M™ STERI-DRAPE™ X-RAY IMAGE INTENSIFIER DRAPE, 10 PER CARTON / 4 CARTONS PER CASE, 1013: Brand: STERI-DRAPE™

## (undated) DEVICE — SOL NS 500ML

## (undated) DEVICE — CLAMP INSERT: Brand: STEALTH® CLAMP INSERT

## (undated) DEVICE — ARM VASCULAR-LF: Brand: MEDLINE INDUSTRIES, INC.

## (undated) DEVICE — EQUISTREAM XK HEMODIALYSIS CATH W/STYLET, ST, AIRGUARD, 16 FR. 23CM: Brand: EQUISTREAM XK LONG-TERM HEMODIALYSIS CATHETER WITH PRELOADED STYLET

## (undated) DEVICE — GLV SURG SENSICARE PI ORTHO SZ7.5 LF STRL

## (undated) DEVICE — GW STARTER JB STR .035 15X180CM

## (undated) DEVICE — SUT VIC 3/0 SH 27IN J416H

## (undated) DEVICE — SUT SILK 2/0 TIES 18IN A185H

## (undated) DEVICE — GLV SURG SENSICARE PI PF LF 7 GRN STRL

## (undated) DEVICE — SUT PROLN 7/0 BV1 D/A 30IN 8703H

## (undated) DEVICE — KT INTRO MIC VSI SMOTH REG 4F 40CM 7CM

## (undated) DEVICE — DRAPE,LAPAROTOMY,PCH,STERILE: Brand: MEDLINE

## (undated) DEVICE — PENCL E/S SMOKEEVAC W/TELESCP CANN

## (undated) DEVICE — STERILE POLYISOPRENE POWDER-FREE SURGICAL GLOVES: Brand: PROTEXIS

## (undated) DEVICE — GLV SURG BIOGEL LTX PF 7

## (undated) DEVICE — KT CATH TAL VENATRAC PALINDROM INSRT STY 23CM

## (undated) DEVICE — SUT MNCRYL PLS ANTIB UD 4/0 PS2 18IN

## (undated) DEVICE — SLV SCD KN/LEN ADJ EXPRSS BLENDED MD 1P/U

## (undated) DEVICE — PINNACLE R/O II INTRODUCER SHEATH WITH RADIOPAQUE MARKER: Brand: PINNACLE

## (undated) DEVICE — RADIFOCUS GLIDECATH: Brand: GLIDECATH

## (undated) DEVICE — LP VESL MINI RED 2PK

## (undated) DEVICE — GLV SURG SENSICARE PI LF PF 7.5 GRN STRL

## (undated) DEVICE — 3M™ IOBAN™ 2 ANTIMICROBIAL INCISE DRAPE 6650EZ: Brand: IOBAN™ 2

## (undated) DEVICE — ELECTRD BLD EDGE COAT 3IN

## (undated) DEVICE — SOL NACL INJ .9PCT 500ML

## (undated) DEVICE — SUT PROLN 2/0 8685H

## (undated) DEVICE — FOGARTY THRU-LUMEN EMBOLECTOMY CATHETER 5.5F 80CM: Brand: FOGARTY

## (undated) DEVICE — SUT NLY 2/0 664G

## (undated) DEVICE — SUT PROLN 7/0 BV1 D/A 24IN 8702H

## (undated) DEVICE — ADHS SKIN PREMIERPRO EXOFIN TOPICAL HI/VISC .5ML

## (undated) DEVICE — ANTIBACTERIAL UNDYED BRAIDED (POLYGLACTIN 910), SYNTHETIC ABSORBABLE SUTURE: Brand: COATED VICRYL

## (undated) DEVICE — GLV SURG SENSICARE PI ORTHO SZ8 LF STRL

## (undated) DEVICE — SYR SLP TP 10ML DISP

## (undated) DEVICE — SUT SILK 3/0 TIES 18IN A184H